# Patient Record
Sex: MALE | Race: WHITE | NOT HISPANIC OR LATINO | Employment: OTHER | URBAN - NONMETROPOLITAN AREA
[De-identification: names, ages, dates, MRNs, and addresses within clinical notes are randomized per-mention and may not be internally consistent; named-entity substitution may affect disease eponyms.]

---

## 2022-09-02 DIAGNOSIS — F90.9 ADULT ADHD: Primary | ICD-10-CM

## 2022-09-02 RX ORDER — DEXTROAMPHETAMINE SACCHARATE, AMPHETAMINE ASPARTATE, DEXTROAMPHETAMINE SULFATE AND AMPHETAMINE SULFATE 3.75; 3.75; 3.75; 3.75 MG/1; MG/1; MG/1; MG/1
TABLET ORAL
Qty: 60 TABLET | Refills: 0 | Status: SHIPPED | OUTPATIENT
Start: 2022-09-02 | End: 2022-09-30

## 2022-09-30 DIAGNOSIS — F90.9 ADULT ADHD: ICD-10-CM

## 2022-09-30 RX ORDER — DEXTROAMPHETAMINE SACCHARATE, AMPHETAMINE ASPARTATE, DEXTROAMPHETAMINE SULFATE AND AMPHETAMINE SULFATE 3.75; 3.75; 3.75; 3.75 MG/1; MG/1; MG/1; MG/1
TABLET ORAL
Qty: 60 TABLET | Refills: 0 | Status: SHIPPED | OUTPATIENT
Start: 2022-09-30 | End: 2022-11-01 | Stop reason: SDUPTHER

## 2022-10-14 PROBLEM — I10 PRIMARY HYPERTENSION: Status: ACTIVE | Noted: 2022-10-14

## 2022-10-14 PROBLEM — E78.2 MIXED HYPERLIPIDEMIA: Status: ACTIVE | Noted: 2022-10-14

## 2022-10-14 PROBLEM — F90.9 ADULT ADHD: Status: ACTIVE | Noted: 2022-10-14

## 2022-10-14 PROBLEM — F17.200 TOBACCO DEPENDENCE: Status: ACTIVE | Noted: 2022-10-14

## 2022-10-14 PROBLEM — E11.9 TYPE 2 DIABETES MELLITUS WITHOUT COMPLICATION, WITHOUT LONG-TERM CURRENT USE OF INSULIN: Status: ACTIVE | Noted: 2022-10-14

## 2022-11-01 DIAGNOSIS — F90.9 ADULT ADHD: ICD-10-CM
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
## 2022-11-02 ENCOUNTER — OFFICE VISIT (OUTPATIENT)
Dept: FAMILY MEDICINE CLINIC | Facility: CLINIC | Age: 37
End: 2022-11-02

## 2022-11-02 VITALS
HEIGHT: 73 IN | SYSTOLIC BLOOD PRESSURE: 130 MMHG | WEIGHT: 185 LBS | OXYGEN SATURATION: 98 % | BODY MASS INDEX: 24.52 KG/M2 | DIASTOLIC BLOOD PRESSURE: 72 MMHG | TEMPERATURE: 98 F | HEART RATE: 82 BPM | RESPIRATION RATE: 17 BRPM

## 2022-11-02 DIAGNOSIS — E78.2 MIXED HYPERLIPIDEMIA: ICD-10-CM

## 2022-11-02 DIAGNOSIS — E11.9 TYPE 2 DIABETES MELLITUS WITHOUT COMPLICATION, WITHOUT LONG-TERM CURRENT USE OF INSULIN: ICD-10-CM

## 2022-11-02 DIAGNOSIS — F90.9 ADULT ADHD: ICD-10-CM

## 2022-11-02 DIAGNOSIS — Z00.00 WELL ADULT EXAM: Primary | ICD-10-CM

## 2022-11-02 PROCEDURE — 81003 URINALYSIS AUTO W/O SCOPE: CPT | Performed by: PHYSICIAN ASSISTANT

## 2022-11-02 PROCEDURE — 82043 UR ALBUMIN QUANTITATIVE: CPT | Performed by: PHYSICIAN ASSISTANT

## 2022-11-02 PROCEDURE — 80061 LIPID PANEL: CPT | Performed by: PHYSICIAN ASSISTANT

## 2022-11-02 PROCEDURE — 99395 PREV VISIT EST AGE 18-39: CPT | Performed by: PHYSICIAN ASSISTANT

## 2022-11-02 PROCEDURE — 2014F MENTAL STATUS ASSESS: CPT | Performed by: PHYSICIAN ASSISTANT

## 2022-11-02 PROCEDURE — 85007 BL SMEAR W/DIFF WBC COUNT: CPT | Performed by: PHYSICIAN ASSISTANT

## 2022-11-02 PROCEDURE — 90686 IIV4 VACC NO PRSV 0.5 ML IM: CPT | Performed by: PHYSICIAN ASSISTANT

## 2022-11-02 PROCEDURE — 86803 HEPATITIS C AB TEST: CPT | Performed by: PHYSICIAN ASSISTANT

## 2022-11-02 PROCEDURE — 83036 HEMOGLOBIN GLYCOSYLATED A1C: CPT | Performed by: PHYSICIAN ASSISTANT

## 2022-11-02 PROCEDURE — 36415 COLL VENOUS BLD VENIPUNCTURE: CPT | Performed by: PHYSICIAN ASSISTANT

## 2022-11-02 PROCEDURE — 3008F BODY MASS INDEX DOCD: CPT | Performed by: PHYSICIAN ASSISTANT

## 2022-11-02 PROCEDURE — 90471 IMMUNIZATION ADMIN: CPT | Performed by: PHYSICIAN ASSISTANT

## 2022-11-02 PROCEDURE — 80050 GENERAL HEALTH PANEL: CPT | Performed by: PHYSICIAN ASSISTANT

## 2022-11-02 RX ORDER — DEXTROAMPHETAMINE SACCHARATE, AMPHETAMINE ASPARTATE, DEXTROAMPHETAMINE SULFATE AND AMPHETAMINE SULFATE 3.75; 3.75; 3.75; 3.75 MG/1; MG/1; MG/1; MG/1
1 TABLET ORAL 2 TIMES DAILY
Qty: 60 TABLET | Refills: 0 | Status: SHIPPED | OUTPATIENT
Start: 2022-11-02 | End: 2022-12-06 | Stop reason: SDUPTHER

## 2022-11-02 RX ORDER — LISINOPRIL 40 MG/1
40 TABLET ORAL DAILY
Qty: 90 TABLET | Refills: 3 | Status: SHIPPED | OUTPATIENT
Start: 2022-11-02 | End: 2022-12-06 | Stop reason: SDUPTHER

## 2022-11-02 RX ORDER — EMPAGLIFLOZIN 25 MG/1
25 TABLET, FILM COATED ORAL DAILY
Qty: 90 TABLET | Refills: 3 | Status: SHIPPED | OUTPATIENT
Start: 2022-11-02 | End: 2022-12-06 | Stop reason: SDUPTHER

## 2022-11-02 RX ORDER — ATORVASTATIN CALCIUM 80 MG/1
80 TABLET, FILM COATED ORAL DAILY
Qty: 90 TABLET | Refills: 3 | Status: SHIPPED | OUTPATIENT
Start: 2022-11-02 | End: 2022-12-06 | Stop reason: SDUPTHER
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
## 2022-11-03 LAB
ALBUMIN SERPL-MCNC: 4.9 G/DL (ref 3.5–5.2)
ALBUMIN UR-MCNC: <1.2 MG/DL
ALBUMIN/GLOB SERPL: 1.8 G/DL
ALP SERPL-CCNC: 107 U/L (ref 39–117)
ALT SERPL W P-5'-P-CCNC: 15 U/L (ref 1–41)
ANION GAP SERPL CALCULATED.3IONS-SCNC: 12 MMOL/L (ref 5–15)
AST SERPL-CCNC: 10 U/L (ref 1–40)
BILIRUB SERPL-MCNC: 0.5 MG/DL (ref 0–1.2)
BILIRUB UR QL STRIP: NEGATIVE
BUN SERPL-MCNC: 15 MG/DL (ref 6–20)
BUN/CREAT SERPL: 13.6 (ref 7–25)
CALCIUM SPEC-SCNC: 10.2 MG/DL (ref 8.6–10.5)
CHLORIDE SERPL-SCNC: 95 MMOL/L (ref 98–107)
CHOLEST SERPL-MCNC: 226 MG/DL (ref 0–200)
CLARITY UR: CLEAR
CO2 SERPL-SCNC: 28 MMOL/L (ref 22–29)
COLOR UR: YELLOW
CREAT SERPL-MCNC: 1.1 MG/DL (ref 0.76–1.27)
DEPRECATED RDW RBC AUTO: 40.4 FL (ref 37–54)
EGFRCR SERPLBLD CKD-EPI 2021: 89.2 ML/MIN/1.73
ERYTHROCYTE [DISTWIDTH] IN BLOOD BY AUTOMATED COUNT: 11.7 % (ref 12.3–15.4)
GLOBULIN UR ELPH-MCNC: 2.7 GM/DL
GLUCOSE SERPL-MCNC: 344 MG/DL (ref 65–99)
GLUCOSE UR STRIP-MCNC: ABNORMAL MG/DL
HBA1C MFR BLD: 11.6 % (ref 4.8–5.6)
HCT VFR BLD AUTO: 45.5 % (ref 37.5–51)
HCV AB SER DONR QL: NORMAL
HDLC SERPL-MCNC: 51 MG/DL (ref 40–60)
HGB BLD-MCNC: 15.6 G/DL (ref 13–17.7)
HGB UR QL STRIP.AUTO: NEGATIVE
KETONES UR QL STRIP: NEGATIVE
LDLC SERPL CALC-MCNC: 151 MG/DL (ref 0–100)
LDLC/HDLC SERPL: 2.91 {RATIO}
LEUKOCYTE ESTERASE UR QL STRIP.AUTO: NEGATIVE
LYMPHOCYTES # BLD MANUAL: 3.23 10*3/MM3 (ref 0.7–3.1)
LYMPHOCYTES NFR BLD MANUAL: 15.3 % (ref 5–12)
MCH RBC QN AUTO: 31.5 PG (ref 26.6–33)
MCHC RBC AUTO-ENTMCNC: 34.3 G/DL (ref 31.5–35.7)
MCV RBC AUTO: 91.7 FL (ref 79–97)
MONOCYTES # BLD: 1.15 10*3/MM3 (ref 0.1–0.9)
NEUTROPHILS # BLD AUTO: 3.15 10*3/MM3 (ref 1.7–7)
NEUTROPHILS NFR BLD MANUAL: 41.8 % (ref 42.7–76)
NITRITE UR QL STRIP: NEGATIVE
PH UR STRIP.AUTO: 5.5 [PH] (ref 5–8)
PLAT MORPH BLD: NORMAL
PLATELET # BLD AUTO: 254 10*3/MM3 (ref 140–450)
PMV BLD AUTO: 10.4 FL (ref 6–12)
POIKILOCYTOSIS BLD QL SMEAR: ABNORMAL
POTASSIUM SERPL-SCNC: 4.2 MMOL/L (ref 3.5–5.2)
PROT SERPL-MCNC: 7.6 G/DL (ref 6–8.5)
PROT UR QL STRIP: NEGATIVE
RBC # BLD AUTO: 4.96 10*6/MM3 (ref 4.14–5.8)
SODIUM SERPL-SCNC: 135 MMOL/L (ref 136–145)
SP GR UR STRIP: >=1.03 (ref 1–1.03)
TRIGL SERPL-MCNC: 132 MG/DL (ref 0–150)
TSH SERPL DL<=0.05 MIU/L-ACNC: 1.73 UIU/ML (ref 0.27–4.2)
UROBILINOGEN UR QL STRIP: ABNORMAL
VARIANT LYMPHS NFR BLD MANUAL: 42.9 % (ref 19.6–45.3)
VLDLC SERPL-MCNC: 24 MG/DL (ref 5–40)
WBC MORPH BLD: NORMAL
WBC NRBC COR # BLD: 7.54 10*3/MM3 (ref 3.4–10.8)

## 2022-11-10 ENCOUNTER — OFFICE VISIT (OUTPATIENT)
Dept: FAMILY MEDICINE CLINIC | Facility: CLINIC | Age: 37
End: 2022-11-10

## 2022-11-10 VITALS
DIASTOLIC BLOOD PRESSURE: 80 MMHG | HEIGHT: 73 IN | TEMPERATURE: 98 F | SYSTOLIC BLOOD PRESSURE: 132 MMHG | OXYGEN SATURATION: 98 % | WEIGHT: 185 LBS | HEART RATE: 82 BPM | BODY MASS INDEX: 24.52 KG/M2

## 2022-11-10 DIAGNOSIS — E11.9 TYPE 2 DIABETES MELLITUS WITHOUT COMPLICATION, WITHOUT LONG-TERM CURRENT USE OF INSULIN: Primary | ICD-10-CM

## 2022-11-10 DIAGNOSIS — F17.200 TOBACCO DEPENDENCE: ICD-10-CM

## 2022-11-10 DIAGNOSIS — I10 PRIMARY HYPERTENSION: ICD-10-CM

## 2022-11-10 DIAGNOSIS — E78.2 MIXED HYPERLIPIDEMIA: ICD-10-CM

## 2022-11-10 PROCEDURE — 99214 OFFICE O/P EST MOD 30 MIN: CPT | Performed by: FAMILY MEDICINE

## 2022-11-10 NOTE — PROGRESS NOTES
"    Follow Up Office Visit      Date: 11/10/2022   Patient Name: Colten Ogden  : 1985   MRN: [id number]     Chief Complaint:    Chief Complaint   Patient presents with   • Diabetes     Fu with labs       History of Present Illness: Colten Ogden is a 37 y.o. male who is here today for follow-up of his recent laboratory data that revealed an elevated hemoglobin A1c 11.6.  He also had abnormal cholesterol as well as some other laboratory data.  Patient states he is not taking his medication on a routine basis he is only averaging medications maybe once or twice a week.  He states he somehow \"forgets\" but somehow remembers to take his ADHD medication.  Patient denies any symptomatology at present time.  He has been seen by his optometrist who states that he has no diabetes involving his eyes as of yet.  He did denies chest pain, shortness of breath, PND, orthopnea, pedal edema or palpitations but has had increasing urinary frequency.  He denies any melena, hematochezia or hematemesis.  No other problems been noted.  He does continue to smoke and does not wish to discontinue this habit at present time.    Subjective      Review of Systems:   Review of Systems   Constitutional: Negative for activity change, appetite change and fatigue.   Respiratory: Negative for cough and shortness of breath.    Cardiovascular: Negative for chest pain, palpitations and leg swelling.   Gastrointestinal: Negative for abdominal pain, constipation, diarrhea, nausea and vomiting.   Genitourinary: Positive for nocturia and urgency. Negative for dysuria, flank pain and frequency.   Neurological: Negative for dizziness, weakness and memory problem.       I have reviewed the patients family history, social history, past medical history, past surgical history and have updated it as appropriate.     Medications:     Current Outpatient Medications:   •  amphetamine-dextroamphetamine (ADDERALL) 15 MG tablet, Take 1 tablet by mouth 2 (Two) " "Times a Day., Disp: 60 tablet, Rfl: 0  •  atorvastatin (LIPITOR) 80 MG tablet, Take 1 tablet by mouth Daily. for cholesterol, Disp: 90 tablet, Rfl: 3  •  Jardiance 25 MG tablet tablet, Take 1 tablet by mouth Daily., Disp: 90 tablet, Rfl: 3  •  Lancets (OneTouch Delica Plus [id number]) misc, 1 each by Other route Daily., Disp: , Rfl:   •  lisinopril (PRINIVIL,ZESTRIL) 40 MG tablet, Take 1 tablet by mouth Daily., Disp: 90 tablet, Rfl: 3  •  metFORMIN (GLUCOPHAGE) 1000 MG tablet, Take 1 tablet by mouth 2 (Two) Times a Day With Meals., Disp: 180 tablet, Rfl: 3    Allergies:   No Known Allergies    Immunizations:   Immunization History   Administered Date(s) Administered   • COVID-19 (UNSPECIFIED) 03/24/2021, 04/21/2021   • FluLaval/Fluzone >6mos 09/14/2018, 11/02/2022   • Hep B, Adolescent or Pediatric 02/13/2002, 03/18/2002, 08/13/2002   • Influenza, Unspecified 09/25/2009   • Td 08/13/2002        Objective     Physical Exam: Please see above  Vital Signs:   Vitals:    11/10/22 1518   BP: 132/80   BP Location: Left arm   Patient Position: Sitting   Cuff Size: Adult   Pulse: 82   Temp: 98 °F (36.7 °C)   SpO2: 98%   Weight: 83.9 kg (185 lb)   Height: 185.4 cm (73\")     Body mass index is 24.41 kg/m².  BMI is within normal parameters. No other follow-up for BMI required.       Physical Exam  Vitals and nursing note reviewed.   Constitutional:       Appearance: Normal appearance.   HENT:      Head: Normocephalic and atraumatic.      Nose: Nose normal.      Mouth/Throat:      Pharynx: Oropharynx is clear.   Eyes:      Extraocular Movements: Extraocular movements intact.      Pupils: Pupils are equal, round, and reactive to light.   Neck:      Thyroid: No thyroid mass or thyromegaly.      Trachea: Trachea normal.   Cardiovascular:      Rate and Rhythm: Normal rate and regular rhythm.      Pulses: Normal pulses. No decreased pulses.      Heart sounds: Normal heart sounds.   Pulmonary:      Effort: Pulmonary effort is normal. "      Breath sounds: Normal breath sounds.   Abdominal:      General: Abdomen is flat. Bowel sounds are normal.      Palpations: Abdomen is soft.      Tenderness: There is no abdominal tenderness.   Musculoskeletal:      Cervical back: Neck supple.      Right lower leg: No edema.      Left lower leg: No edema.   Lymphadenopathy:      Cervical: No cervical adenopathy.   Skin:     General: Skin is warm and dry.   Neurological:      General: No focal deficit present.      Mental Status: He is alert and oriented to person, place, and time.      Cranial Nerves: Cranial nerves are intact.      Sensory: Sensation is intact.      Motor: Motor function is intact.      Coordination: Coordination is intact.   Psychiatric:         Attention and Perception: Attention normal.         Mood and Affect: Mood normal.         Speech: Speech normal.         Behavior: Behavior normal.         Procedures    Results:   Labs:   Hemoglobin A1C   Date Value Ref Range Status   11/02/2022 11.60 (H) 4.80 - 5.60 % Final     TSH   Date Value Ref Range Status   11/02/2022 1.730 0.270 - 4.200 uIU/mL Final        POCT Results (if applicable):   Results for orders placed or performed in visit on 11/02/22   Comprehensive Metabolic Panel    Specimen: Arm, Right; Blood   Result Value Ref Range    Glucose 344 (H) 65 - 99 mg/dL    BUN 15 6 - 20 mg/dL    Creatinine 1.10 0.76 - 1.27 mg/dL    Sodium 135 (L) 136 - 145 mmol/L    Potassium 4.2 3.5 - 5.2 mmol/L    Chloride 95 (L) 98 - 107 mmol/L    CO2 28.0 22.0 - 29.0 mmol/L    Calcium 10.2 8.6 - 10.5 mg/dL    Total Protein 7.6 6.0 - 8.5 g/dL    Albumin 4.90 3.50 - 5.20 g/dL    ALT (SGPT) 15 1 - 41 U/L    AST (SGOT) 10 1 - 40 U/L    Alkaline Phosphatase 107 39 - 117 U/L    Total Bilirubin 0.5 0.0 - 1.2 mg/dL    Globulin 2.7 gm/dL    A/G Ratio 1.8 g/dL    BUN/Creatinine Ratio 13.6 7.0 - 25.0    Anion Gap 12.0 5.0 - 15.0 mmol/L    eGFR 89.2 >60.0 mL/min/1.73   Hemoglobin A1c    Specimen: Arm, Right; Blood   Result  Value Ref Range    Hemoglobin A1C 11.60 (H) 4.80 - 5.60 %   TSH    Specimen: Arm, Right; Blood   Result Value Ref Range    TSH 1.730 0.270 - 4.200 uIU/mL   Lipid Panel    Specimen: Arm, Right; Blood   Result Value Ref Range    Total Cholesterol 226 (H) 0 - 200 mg/dL    Triglycerides 132 0 - 150 mg/dL    HDL Cholesterol 51 40 - 60 mg/dL    LDL Cholesterol  151 (H) 0 - 100 mg/dL    VLDL Cholesterol 24 5 - 40 mg/dL    LDL/HDL Ratio 2.91    Hepatitis C Antibody    Specimen: Arm, Right; Blood   Result Value Ref Range    Hepatitis C Ab Non-Reactive Non-Reactive   MicroAlbumin, Urine, Random - Urine, Clean Catch    Specimen: Urine, Clean Catch   Result Value Ref Range    Microalbumin, Urine <1.2 mg/dL   Urinalysis without microscopic (no culture) - Urine, Clean Catch    Specimen: Urine, Clean Catch   Result Value Ref Range    Color, UA Yellow Yellow, Straw    Appearance, UA Clear Clear    pH, UA 5.5 5.0 - 8.0    Specific Gravity, UA >=1.030 1.005 - 1.030    Glucose, UA >=1000 mg/dL (3+) (A) Negative    Ketones, UA Negative Negative    Bilirubin, UA Negative Negative    Blood, UA Negative Negative    Protein, UA Negative Negative    Leuk Esterase, UA Negative Negative    Nitrite, UA Negative Negative    Urobilinogen, UA 0.2 E.U./dL 0.2 - 1.0 E.U./dL   CBC Auto Differential    Specimen: Arm, Right; Blood   Result Value Ref Range    WBC 7.54 3.40 - 10.80 10*3/mm3    RBC 4.96 4.14 - 5.80 10*6/mm3    Hemoglobin 15.6 13.0 - 17.7 g/dL    Hematocrit 45.5 37.5 - 51.0 %    MCV 91.7 79.0 - 97.0 fL    MCH 31.5 26.6 - 33.0 pg    MCHC 34.3 31.5 - 35.7 g/dL    RDW 11.7 (L) 12.3 - 15.4 %    RDW-SD 40.4 37.0 - 54.0 fl    MPV 10.4 6.0 - 12.0 fL    Platelets 254 140 - 450 10*3/mm3   Manual Differential    Specimen: Arm, Right; Blood   Result Value Ref Range    Neutrophil % 41.8 (L) 42.7 - 76.0 %    Lymphocyte % 42.9 19.6 - 45.3 %    Monocyte % 15.3 (H) 5.0 - 12.0 %    Neutrophils Absolute 3.15 1.70 - 7.00 10*3/mm3    Lymphocytes Absolute  "3.23 (H) 0.70 - 3.10 10*3/mm3    Monocytes Absolute 1.15 (H) 0.10 - 0.90 10*3/mm3    Poikilocytes Mod/2+ None Seen    WBC Morphology Normal Normal    Platelet Morphology Normal Normal       Imaging:   No valid procedures specified.     Measures:   Advanced Care Planning:   Patient does not have an advance directive, information provided.    Smoking Cessation:   Smoker.    Assessment / Plan      Assessment/Plan:   Diagnoses and all orders for this visit:    1. Type 2 diabetes mellitus without complication, without long-term current use of insulin (HCC) (Primary)   ` At present time I am sore eval also how to approach a noncompliant patient with respect to diabetes.  Patient seems to be oblivious to other complications that he is causing to his body and again we have encouraged him to take the medication as prescribed.  We have discussed options and he will set his alarm on his phone to 10 AM so that he can remember to take all of his medications at that time.  He is of interest that he has no problems take his ADHD medicine but he states he carries this around with him in his pocket so it would not be \"stolen\".  We have encouraged him to monitor his symptomatology.  He will return to clinic in the next couple weeks to see how he is doing.  I am uncertain if we will be able to provide much more care for him if he is not going to take care of himself.    2. Mixed hyperlipidemia   His lipids are not to goal.  He has not taken his cholesterol medication in quite some time.  We did discuss risk and benefits and he appears to understand the significance of not taking his medication that greatly increases his cardiovascular risk.  We have also encouraged him discontinue smoking as well as take his other medication as prescribed but patient will do as he wishes.  We will continue to encourage him and will reassess in 3 to 4 weeks and may do so on a routine basis to encourage his compliance.    3. Primary " hypertension   Patient does take his blood pressure medicine little bit more frequent than the other medications.  He is doing well at present time with respect to tolerance of the medications.  We again have encouraged him to take it on a daily basis as this reduces his incidence of having further cardiovascular events.    4. Tobacco dependence   Patient continues to smoke.  He is not willing to discontinue present time.  We will continue to encourage him to stop his nicotine usage.      Follow Up:   Return in about 3 weeks (around 12/1/2022) for Recheck.      At [facility], we believe that sharing information builds trust and better relationships. You are receiving this note because you recently visited [facility]. It is possible you will see health information before a provider has talked with you about it. This kind of information can be easy to misunderstand. To help you fully understand what it means for your health, we urge you to discuss this note with your provider.    Steven Rivas MD  [facility]

## 2022-12-05 ENCOUNTER — OFFICE VISIT (OUTPATIENT)
Dept: FAMILY MEDICINE CLINIC | Facility: CLINIC | Age: 37
End: 2022-12-05

## 2022-12-05 VITALS
HEART RATE: 103 BPM | SYSTOLIC BLOOD PRESSURE: 138 MMHG | BODY MASS INDEX: 25.18 KG/M2 | OXYGEN SATURATION: 96 % | HEIGHT: 73 IN | DIASTOLIC BLOOD PRESSURE: 82 MMHG | TEMPERATURE: 98.4 F | WEIGHT: 190 LBS

## 2022-12-05 DIAGNOSIS — E11.65 TYPE 2 DIABETES MELLITUS WITH HYPERGLYCEMIA, WITHOUT LONG-TERM CURRENT USE OF INSULIN: Primary | ICD-10-CM

## 2022-12-05 LAB
EXPIRATION DATE: NORMAL
GLUCOSE BLDC GLUCOMTR-MCNC: 123 MG/DL (ref 70–130)
Lab: NORMAL

## 2022-12-05 PROCEDURE — 99213 OFFICE O/P EST LOW 20 MIN: CPT | Performed by: PHYSICIAN ASSISTANT

## 2022-12-05 PROCEDURE — 82962 GLUCOSE BLOOD TEST: CPT | Performed by: PHYSICIAN ASSISTANT

## 2022-12-05 NOTE — PROGRESS NOTES
"    Follow Up Office Visit      Date: 2022   Patient Name: Colten Ogden  : 1985   MRN: [id number]     Chief Complaint:    Chief Complaint   Patient presents with   • Annual Exam       History of Present Illness: Colten Ogden is a 37 y.o. male who is here today for recent extremely elevated hemoglobin A1c.  He reports that he has been setting an alarm to remind him to take his medication.  He denies any side effects from using it..    Subjective      Review of Systems:   Review of Systems   Constitutional: Negative.    HENT: Negative.    Eyes: Negative.    Respiratory: Negative.    Cardiovascular: Negative.    Gastrointestinal: Negative.        I have reviewed the patients family history, social history, past medical history, past surgical history and have updated it as appropriate.     Medications:     Current Outpatient Medications:   •  amphetamine-dextroamphetamine (ADDERALL) 15 MG tablet, Take 1 tablet by mouth 2 (Two) Times a Day., Disp: 60 tablet, Rfl: 0  •  atorvastatin (LIPITOR) 80 MG tablet, Take 1 tablet by mouth Daily. for cholesterol, Disp: 90 tablet, Rfl: 3  •  Jardiance 25 MG tablet tablet, Take 1 tablet by mouth Daily., Disp: 90 tablet, Rfl: 3  •  Lancets (OneTouch Delica Plus [id number]) misc, 1 each by Other route Daily., Disp: , Rfl:   •  lisinopril (PRINIVIL,ZESTRIL) 40 MG tablet, Take 1 tablet by mouth Daily., Disp: 90 tablet, Rfl: 3  •  metFORMIN (GLUCOPHAGE) 1000 MG tablet, Take 1 tablet by mouth 2 (Two) Times a Day With Meals., Disp: 180 tablet, Rfl: 3    Allergies:   No Known Allergies    Objective     Physical Exam: Please see above  Vital Signs:   Vitals:    22 0822   BP: 138/82   BP Location: Left arm   Patient Position: Sitting   Cuff Size: Adult   Pulse: 103   Temp: 98.4 °F (36.9 °C)   TempSrc: Temporal   SpO2: 96%   Weight: 86.2 kg (190 lb)   Height: 185.4 cm (73\")   PainSc: 0-No pain     Body mass index is 25.07 kg/m².  BMI is >= 25 and <30. (Overweight) The " following options were offered after discussion;: nutrition counseling/recommendations          Physical Exam  Constitutional:       General: He is not in acute distress.     Appearance: Normal appearance. He is not toxic-appearing.   Cardiovascular:      Rate and Rhythm: Normal rate and regular rhythm.      Heart sounds: No murmur heard.    No friction rub. No gallop.   Neurological:      Mental Status: He is alert.         Procedures    Assessment / Plan      Assessment/Plan:   1. Type 2 diabetes mellitus with hyperglycemia, without long-term current use of insulin (HCC)  His POC glucose which was fasting was much improved at 123.  He has been encouraged to continue his medication and keep his appointment that was scheduled in February.  He voices understanding of the risks of allowing his blood sugar to remain high.  - POC Glucose      Follow Up:   No follow-ups on file.      At [facility], we believe that sharing information builds trust and better relationships. You are receiving this note because you recently visited [facility]. It is possible you will see health information before a provider has talked with you about it. This kind of information can be easy to misunderstand. To help you fully understand what it means for your health, we urge you to discuss this note with your provider.    Janee Rivas PA-C  [facility]

## 2022-12-06 DIAGNOSIS — F90.9 ADULT ADHD: ICD-10-CM

## 2022-12-06 RX ORDER — ATORVASTATIN CALCIUM 80 MG/1
80 TABLET, FILM COATED ORAL DAILY
Qty: 90 TABLET | Refills: 3 | Status: SHIPPED | OUTPATIENT
Start: 2022-12-06

## 2022-12-06 RX ORDER — LISINOPRIL 40 MG/1
40 TABLET ORAL DAILY
Qty: 90 TABLET | Refills: 3 | Status: SHIPPED | OUTPATIENT
Start: 2022-12-06

## 2022-12-06 RX ORDER — DEXTROAMPHETAMINE SACCHARATE, AMPHETAMINE ASPARTATE, DEXTROAMPHETAMINE SULFATE AND AMPHETAMINE SULFATE 3.75; 3.75; 3.75; 3.75 MG/1; MG/1; MG/1; MG/1
1 TABLET ORAL 2 TIMES DAILY
Qty: 60 TABLET | Refills: 0 | Status: SHIPPED | OUTPATIENT
Start: 2022-12-06 | End: 2023-01-04 | Stop reason: SDUPTHER

## 2022-12-06 RX ORDER — EMPAGLIFLOZIN 25 MG/1
25 TABLET, FILM COATED ORAL DAILY
Qty: 90 TABLET | Refills: 3 | Status: SHIPPED | OUTPATIENT
Start: 2022-12-06

## 2022-12-06 NOTE — TELEPHONE ENCOUNTER
Caller: Colten Ogden    Relationship: Self    Best call back number: [phone]    Requested Prescriptions:   Requested Prescriptions     Pending Prescriptions Disp Refills   • amphetamine-dextroamphetamine (ADDERALL) 15 MG tablet 60 tablet 0     Sig: Take 1 tablet by mouth 2 (Two) Times a Day.   • Jardiance 25 MG tablet tablet 90 tablet 3     Sig: Take 1 tablet by mouth Daily.   • metFORMIN (GLUCOPHAGE) 1000 MG tablet 180 tablet 3     Sig: Take 1 tablet by mouth 2 (Two) Times a Day With Meals.   • lisinopril (PRINIVIL,ZESTRIL) 40 MG tablet 90 tablet 3     Sig: Take 1 tablet by mouth Daily.   • atorvastatin (LIPITOR) 80 MG tablet 90 tablet 3     Sig: Take 1 tablet by mouth Daily. for cholesterol        [address] - [phone]  - [phone] FX     Additional details provided by patient:     Does the patient have less than a 3 day supply:  [x] Yes  [] No    Sammy Kim Rep   12/06/22 11:08 EST

## 2023-01-04 DIAGNOSIS — F90.9 ADULT ADHD: Primary | ICD-10-CM

## 2023-01-04 DIAGNOSIS — F90.9 ADULT ADHD: ICD-10-CM

## 2023-01-04 RX ORDER — DEXTROAMPHETAMINE SACCHARATE, AMPHETAMINE ASPARTATE, DEXTROAMPHETAMINE SULFATE AND AMPHETAMINE SULFATE 3.75; 3.75; 3.75; 3.75 MG/1; MG/1; MG/1; MG/1
1 TABLET ORAL 2 TIMES DAILY
Qty: 60 TABLET | Refills: 0 | Status: SHIPPED | OUTPATIENT
Start: 2023-01-04 | End: 2023-01-04 | Stop reason: SDUPTHER

## 2023-01-04 RX ORDER — DEXTROAMPHETAMINE SACCHARATE, AMPHETAMINE ASPARTATE, DEXTROAMPHETAMINE SULFATE AND AMPHETAMINE SULFATE 7.5; 7.5; 7.5; 7.5 MG/1; MG/1; MG/1; MG/1
15 TABLET ORAL 2 TIMES DAILY
Qty: 30 TABLET | Refills: 0 | Status: SHIPPED | OUTPATIENT
Start: 2023-01-04 | End: 2023-02-06

## 2023-01-04 NOTE — TELEPHONE ENCOUNTER
Caller: Colten Ogden    Relationship: Self    Best call back number: [phone]    Requested Prescriptions:   Requested Prescriptions     Pending Prescriptions Disp Refills   • amphetamine-dextroamphetamine (ADDERALL) 15 MG tablet 60 tablet 0     Sig: Take 1 tablet by mouth 2 (Two) Times a Day.        [address] be [address] [facility] - [phone]  - [phone] FX     Additional details provided by patient: PATIENT HAS FEW DAYS LEFT OF MEDICATION    Does the patient have less than a 3 day supply:  [x] Yes  [] No    Would you like a call back once the refill request has been completed: [x] Yes [] No    If the office needs to give you a call back, can they leave a voicemail: [x] Yes [] No    Sammy Davenport Rep   01/04/23 08:38 EST

## 2023-02-06 ENCOUNTER — OFFICE VISIT (OUTPATIENT)
Dept: FAMILY MEDICINE CLINIC | Facility: CLINIC | Age: 38
End: 2023-02-06
Payer: COMMERCIAL

## 2023-02-06 VITALS
WEIGHT: 180 LBS | RESPIRATION RATE: 15 BRPM | TEMPERATURE: 98 F | HEART RATE: 70 BPM | SYSTOLIC BLOOD PRESSURE: 100 MMHG | BODY MASS INDEX: 23.75 KG/M2 | DIASTOLIC BLOOD PRESSURE: 70 MMHG | OXYGEN SATURATION: 98 %

## 2023-02-06 DIAGNOSIS — E11.65 TYPE 2 DIABETES MELLITUS WITH HYPERGLYCEMIA, WITHOUT LONG-TERM CURRENT USE OF INSULIN: Primary | ICD-10-CM

## 2023-02-06 DIAGNOSIS — F90.9 ADULT ADHD: ICD-10-CM

## 2023-02-06 DIAGNOSIS — F17.200 TOBACCO DEPENDENCE: ICD-10-CM

## 2023-02-06 DIAGNOSIS — Z23 NEED FOR PNEUMOCOCCAL VACCINATION: ICD-10-CM

## 2023-02-06 DIAGNOSIS — E78.2 MIXED HYPERLIPIDEMIA: ICD-10-CM

## 2023-02-06 DIAGNOSIS — I10 PRIMARY HYPERTENSION: ICD-10-CM

## 2023-02-06 LAB
AMPHET+METHAMPHET UR QL: POSITIVE
AMPHETAMINES UR QL: POSITIVE
BARBITURATES UR QL SCN: NEGATIVE
BENZODIAZ UR QL SCN: NEGATIVE
BUPRENORPHINE SERPL-MCNC: NEGATIVE NG/ML
CANNABINOIDS SERPL QL: NEGATIVE
COCAINE UR QL: NEGATIVE
METHADONE UR QL SCN: NEGATIVE
OPIATES UR QL: NEGATIVE
OXYCODONE UR QL SCN: NEGATIVE
PCP UR QL SCN: NEGATIVE
PROPOXYPH UR QL: NEGATIVE
TRICYCLICS UR QL SCN: NEGATIVE

## 2023-02-06 PROCEDURE — 80053 COMPREHEN METABOLIC PANEL: CPT | Performed by: FAMILY MEDICINE

## 2023-02-06 PROCEDURE — 80306 DRUG TEST PRSMV INSTRMNT: CPT | Performed by: FAMILY MEDICINE

## 2023-02-06 PROCEDURE — 80061 LIPID PANEL: CPT | Performed by: FAMILY MEDICINE

## 2023-02-06 PROCEDURE — 99214 OFFICE O/P EST MOD 30 MIN: CPT | Performed by: FAMILY MEDICINE

## 2023-02-06 PROCEDURE — 83036 HEMOGLOBIN GLYCOSYLATED A1C: CPT | Performed by: FAMILY MEDICINE

## 2023-02-06 RX ORDER — DEXTROAMPHETAMINE SACCHARATE, AMPHETAMINE ASPARTATE, DEXTROAMPHETAMINE SULFATE AND AMPHETAMINE SULFATE 7.5; 7.5; 7.5; 7.5 MG/1; MG/1; MG/1; MG/1
0.5 TABLET ORAL 2 TIMES DAILY
Qty: 30 TABLET | Refills: 0 | Status: CANCELLED | OUTPATIENT
Start: 2023-02-06

## 2023-02-06 RX ORDER — DEXTROAMPHETAMINE SACCHARATE, AMPHETAMINE ASPARTATE, DEXTROAMPHETAMINE SULFATE AND AMPHETAMINE SULFATE 7.5; 7.5; 7.5; 7.5 MG/1; MG/1; MG/1; MG/1
TABLET ORAL
Qty: 30 TABLET | Refills: 0 | Status: SHIPPED | OUTPATIENT
Start: 2023-02-06 | End: 2023-03-07

## 2023-02-06 NOTE — PROGRESS NOTES
Follow Up Office Visit      Date: 2023   Patient Name: Colten Ogden  : 1985   MRN: [id number]     Chief Complaint:    Chief Complaint   Patient presents with   • ADHD     fu   • Diabetes     fu       History of Present Illness: Colten Ogden is a 37 y.o. male who is here today for follow-up of his multiple medical conditions.  He is doing well with the ADHD medications without any side effects.  He has continued to take on a daily basis and does well without difficulties.  He has been taking his medications more faithfully.  He states that he now takes it on average 4 to 5 days a week.  He will occasionally forget.  He has been trying to monitor his diet and has increase his activity level.  He denies any episodes of hypoglycemia at present time.  He has continued with normal activity, appetite and sleep.  Patient denies any other cardiovascular, respiratory, gastrointestinal, urologic or neurologic symptomatology.    Subjective      Review of Systems:   Review of Systems   Constitutional: Negative for activity change, appetite change and fatigue.   Respiratory: Negative for cough and shortness of breath.    Cardiovascular: Negative for chest pain, palpitations and leg swelling.   Gastrointestinal: Negative for abdominal pain, constipation, diarrhea, nausea and vomiting.   Genitourinary: Negative for dysuria, flank pain, frequency and urgency.   Musculoskeletal: Negative for arthralgias, gait problem and myalgias.   Neurological: Negative for dizziness, weakness and memory problem.   Psychiatric/Behavioral: Negative for sleep disturbance.       I have reviewed the patients family history, social history, past medical history, past surgical history and have updated it as appropriate.     Medications:     Current Outpatient Medications:   •  amphetamine-dextroamphetamine (ADDERALL) 30 MG tablet, Take 0.5 tablets by mouth 2 (Two) Times a Day., Disp: 30 tablet, Rfl: 0  •  atorvastatin (LIPITOR) 80 MG  tablet, Take 1 tablet by mouth Daily. for cholesterol, Disp: 90 tablet, Rfl: 3  •  Jardiance 25 MG tablet tablet, Take 1 tablet by mouth Daily., Disp: 90 tablet, Rfl: 3  •  Lancets (OneTouch Delica Plus [id number]) misc, 1 each by Other route Daily., Disp: , Rfl:   •  lisinopril (PRINIVIL,ZESTRIL) 40 MG tablet, Take 1 tablet by mouth Daily., Disp: 90 tablet, Rfl: 3  •  metFORMIN (GLUCOPHAGE) 1000 MG tablet, Take 1 tablet by mouth 2 (Two) Times a Day With Meals., Disp: 180 tablet, Rfl: 3    Allergies:   No Known Allergies    Immunizations:   Immunization History   Administered Date(s) Administered   • COVID-19 (UNSPECIFIED) 03/24/2021, 04/21/2021   • FluLaval/Fluzone >6mos 09/14/2018, 11/02/2022   • Hep B, Adolescent or Pediatric 02/13/2002, 03/18/2002, 08/13/2002   • Influenza, Unspecified 09/25/2009, 11/02/2022   • Td 08/13/2002        Objective     Physical Exam: Please see above  Vital Signs:   Vitals:    02/06/23 0813   BP: 100/70   BP Location: Left arm   Patient Position: Sitting   Cuff Size: Adult   Pulse: 70   Resp: 15   Temp: 98 °F (36.7 °C)   SpO2: 98%   Weight: 81.6 kg (180 lb)     Body mass index is 23.75 kg/m².  BMI is within normal parameters. No other follow-up for BMI required.       Physical Exam  Vitals and nursing note reviewed.   Constitutional:       Appearance: Normal appearance.   HENT:      Head: Normocephalic and atraumatic.      Nose: Nose normal.      Mouth/Throat:      Pharynx: Oropharynx is clear.   Eyes:      Extraocular Movements: Extraocular movements intact.      Pupils: Pupils are equal, round, and reactive to light.   Neck:      Thyroid: No thyroid mass or thyromegaly.      Trachea: Trachea normal.   Cardiovascular:      Rate and Rhythm: Normal rate and regular rhythm.      Pulses: Normal pulses. No decreased pulses.           Dorsalis pedis pulses are 2+ on the right side and 2+ on the left side.        Posterior tibial pulses are 2+ on the right side and 2+ on the left side.       Heart sounds: Normal heart sounds.   Pulmonary:      Effort: Pulmonary effort is normal.      Breath sounds: Normal breath sounds.   Abdominal:      General: Abdomen is flat. Bowel sounds are normal.      Palpations: Abdomen is soft.      Tenderness: There is no abdominal tenderness.   Musculoskeletal:      Cervical back: Neck supple.      Right lower leg: No edema.      Left lower leg: No edema.      Right foot: No deformity.      Left foot: No deformity.   Feet:      Right foot:      Protective Sensation: 3 sites tested. 3 sites sensed.      Skin integrity: Callus and dry skin present. No ulcer, skin breakdown or erythema.      Left foot:      Protective Sensation: 3 sites tested. 3 sites sensed.      Skin integrity: Callus and dry skin present. No ulcer, skin breakdown or erythema.      Comments: Bilateral MP of great toe and heel region callus.  Lymphadenopathy:      Cervical: No cervical adenopathy.   Skin:     General: Skin is warm and dry.   Neurological:      General: No focal deficit present.      Mental Status: He is alert and oriented to person, place, and time.      Sensory: Sensation is intact.      Motor: Motor function is intact.      Coordination: Coordination is intact.   Psychiatric:         Attention and Perception: Attention normal.         Mood and Affect: Mood normal.         Speech: Speech normal.         Behavior: Behavior normal.         Procedures    Results:   Labs:   Hemoglobin A1C   Date Value Ref Range Status   11/02/2022 11.60 (H) 4.80 - 5.60 % Final     TSH   Date Value Ref Range Status   11/02/2022 1.730 0.270 - 4.200 uIU/mL Final        POCT Results (if applicable):   Results for orders placed or performed in visit on 12/05/22   POC Glucose    Specimen: Blood   Result Value Ref Range    Glucose 123 70 - 130 mg/dL    Lot Number [id number]     Expiration Date 03/28/2023        Imaging:   No valid procedures specified.     Measures:   Advanced Care Planning:   Patient does not have an  advance directive, information provided.    Smoking Cessation:   less than 3 minutes spent counseling Has reduced [place] use    Assessment / Plan      Assessment/Plan:   Diagnoses and all orders for this visit:    1. Type 2 diabetes mellitus with hyperglycemia, without long-term current use of insulin ([facility]) (Primary)   Patient's hemoglobin A1c was extremely elevated on previous check.  We will recheck his hemoglobin A1c and hopefully will show improvement.  If not we will need to add additional medications with possibly insulin.  Patient has been doing better with respect to his medications but still does not take it on a daily basis.  He also had diabetic foot exam that did reveal calluses on his MP joint of both great toes.  He also has callus formation on his heels.  We have encouraged him to continue with daily inspection as well as washing with application of Vaseline to the calluses to hopefully soften.  We also have advised that he should keep a yearly eye exam with Dr. Akhtar.  We will discuss options with respect to the blood test once we receive the hemoglobin A1c.  -     Hemoglobin A1c; Future  -     Hemoglobin A1c    2. Mixed hyperlipidemia   Patient is taking his Lipitor faithfully.  We will obtain lipid profile will make adjustments based on this data.  -     Lipid Panel; Future  -     Lipid Panel    3. Primary hypertension   Patient's blood pressure has been doing well.  We will continue his current regimen.  -     Comprehensive Metabolic Panel; Future  -     Comprehensive Metabolic Panel    4. Adult ADHD   Patient's previous Williams was appropriate.  He is due for urine drug screen.  This will be obtained today.  -     Urine Drug Screen - Urine, Clean Catch; Future  -     Urine Drug Screen - Urine, Clean Catch    5. Tobacco dependence   Patient has not smoked a cigarette for 4 days.  He is using electronic cigarettes in the meantime.  We again have encouraged him to discontinue smoking.    6. Need  for pneumococcal vaccination   Patient was instructed to contact the pharmacist with respect to his tetanus shot and pneumonia vaccine.      Follow Up:   Return in about 3 months (around 5/6/2023) for Recheck.      At [facility], we believe that sharing information builds trust and better relationships. You are receiving this note because you recently visited [facility]. It is possible you will see health information before a provider has talked with you about it. This kind of information can be easy to misunderstand. To help you fully understand what it means for your health, we urge you to discuss this note with your provider.    Steven Rivas MD  [facility]

## 2023-02-06 NOTE — TELEPHONE ENCOUNTER
Caller: Colten Ogden    Relationship: Self    Best call back number: [phone]    Requested Prescriptions:   Requested Prescriptions     Pending Prescriptions Disp Refills    amphetamine-dextroamphetamine (ADDERALL) 30 MG tablet 30 tablet 0     Sig: Take 0.5 tablets by mouth 2 (Two) Times a Day.      [address] [facility] - [phone]  - [phone]     Additional details provided by patient:     PATIENT ADVISED HE HAS 3 PILLS LEFT     Does the patient have less than a 3 day supply:  [x] Yes  [] No    Would you like a call back once the refill request has been completed: [] Yes [x] No    If the office needs to give you a call back, can they leave a voicemail: [] Yes [x] No    Sammy Ludwig Rep   02/06/23 10:48 EST

## 2023-02-07 LAB
ALBUMIN SERPL-MCNC: 4.5 G/DL (ref 3.5–5.2)
ALBUMIN/GLOB SERPL: 1.7 G/DL
ALP SERPL-CCNC: 79 U/L (ref 39–117)
ALT SERPL W P-5'-P-CCNC: 29 U/L (ref 1–41)
ANION GAP SERPL CALCULATED.3IONS-SCNC: 10.7 MMOL/L (ref 5–15)
AST SERPL-CCNC: 23 U/L (ref 1–40)
BILIRUB SERPL-MCNC: 0.3 MG/DL (ref 0–1.2)
BUN SERPL-MCNC: 19 MG/DL (ref 6–20)
BUN/CREAT SERPL: 16.5 (ref 7–25)
CALCIUM SPEC-SCNC: 9.6 MG/DL (ref 8.6–10.5)
CHLORIDE SERPL-SCNC: 104 MMOL/L (ref 98–107)
CHOLEST SERPL-MCNC: 135 MG/DL (ref 0–200)
CO2 SERPL-SCNC: 27.3 MMOL/L (ref 22–29)
CREAT SERPL-MCNC: 1.15 MG/DL (ref 0.76–1.27)
EGFRCR SERPLBLD CKD-EPI 2021: 84.6 ML/MIN/1.73
GLOBULIN UR ELPH-MCNC: 2.6 GM/DL
GLUCOSE SERPL-MCNC: 190 MG/DL (ref 65–99)
HBA1C MFR BLD: 9.4 % (ref 4.8–5.6)
HDLC SERPL-MCNC: 59 MG/DL (ref 40–60)
LDLC SERPL CALC-MCNC: 61 MG/DL (ref 0–100)
LDLC/HDLC SERPL: 1.02 {RATIO}
POTASSIUM SERPL-SCNC: 3.9 MMOL/L (ref 3.5–5.2)
PROT SERPL-MCNC: 7.1 G/DL (ref 6–8.5)
SODIUM SERPL-SCNC: 142 MMOL/L (ref 136–145)
TRIGL SERPL-MCNC: 79 MG/DL (ref 0–150)
VLDLC SERPL-MCNC: 15 MG/DL (ref 5–40)

## 2023-02-07 RX ORDER — PIOGLITAZONEHYDROCHLORIDE 45 MG/1
45 TABLET ORAL DAILY
Qty: 90 TABLET | Refills: 3 | Status: SHIPPED | OUTPATIENT
Start: 2023-02-07

## 2023-03-07 DIAGNOSIS — F90.9 ADULT ADHD: ICD-10-CM

## 2023-03-07 RX ORDER — DEXTROAMPHETAMINE SACCHARATE, AMPHETAMINE ASPARTATE, DEXTROAMPHETAMINE SULFATE AND AMPHETAMINE SULFATE 7.5; 7.5; 7.5; 7.5 MG/1; MG/1; MG/1; MG/1
TABLET ORAL
Qty: 30 TABLET | Refills: 0 | Status: SHIPPED | OUTPATIENT
Start: 2023-03-07 | End: 2023-04-04 | Stop reason: SDUPTHER

## 2023-03-20 ENCOUNTER — OFFICE VISIT (OUTPATIENT)
Dept: FAMILY MEDICINE CLINIC | Facility: CLINIC | Age: 38
End: 2023-03-20
Payer: COMMERCIAL

## 2023-03-20 VITALS
HEART RATE: 80 BPM | TEMPERATURE: 97 F | SYSTOLIC BLOOD PRESSURE: 126 MMHG | BODY MASS INDEX: 23.86 KG/M2 | DIASTOLIC BLOOD PRESSURE: 78 MMHG | RESPIRATION RATE: 16 BRPM | OXYGEN SATURATION: 97 % | HEIGHT: 73 IN | WEIGHT: 180 LBS

## 2023-03-20 DIAGNOSIS — G89.29 CHRONIC RIGHT SHOULDER PAIN: Primary | ICD-10-CM

## 2023-03-20 DIAGNOSIS — M25.511 CHRONIC RIGHT SHOULDER PAIN: Primary | ICD-10-CM

## 2023-03-20 PROCEDURE — 3046F HEMOGLOBIN A1C LEVEL >9.0%: CPT | Performed by: PHYSICIAN ASSISTANT

## 2023-03-20 PROCEDURE — 3078F DIAST BP <80 MM HG: CPT | Performed by: PHYSICIAN ASSISTANT

## 2023-03-20 PROCEDURE — 3074F SYST BP LT 130 MM HG: CPT | Performed by: PHYSICIAN ASSISTANT

## 2023-03-20 PROCEDURE — 99213 OFFICE O/P EST LOW 20 MIN: CPT | Performed by: PHYSICIAN ASSISTANT
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
## 2023-04-04 DIAGNOSIS — F90.9 ADULT ADHD: ICD-10-CM

## 2023-04-04 RX ORDER — DEXTROAMPHETAMINE SACCHARATE, AMPHETAMINE ASPARTATE, DEXTROAMPHETAMINE SULFATE AND AMPHETAMINE SULFATE 7.5; 7.5; 7.5; 7.5 MG/1; MG/1; MG/1; MG/1
0.5 TABLET ORAL 2 TIMES DAILY
Qty: 30 TABLET | Refills: 0 | Status: SHIPPED | OUTPATIENT
Start: 2023-04-04

## 2023-04-04 NOTE — TELEPHONE ENCOUNTER
Caller: Colten Ogden    Relationship: Self    Best call back number:    [phone]      Requested Prescriptions:   Requested Prescriptions     Pending Prescriptions Disp Refills   • amphetamine-dextroamphetamine (ADDERALL) 30 MG tablet 30 tablet 0     Sig: Take 0.5 tablets by mouth 2 (Two) Times a Day.        [address] [facility] - [phone]  - [phone]      Last office visit with prescribing clinician: 2/6/2023   Last telemedicine visit with prescribing clinician: 5/5/2023   Next office visit with prescribing clinician: 5/5/2023     Additional details provided by patient: PATIENT HAS LESS THAN 3 LEFT    Does the patient have less than a 3 day supply:  [x] Yes  [] No    Would you like a call back once the refill request has been completed: [] Yes [] No    If the office needs to give you a call back, can they leave a voicemail: [] Yes [] No    Sammy Galeana Rep   04/04/23 15:46 EDT

## 2023-05-05 ENCOUNTER — OFFICE VISIT (OUTPATIENT)
Dept: FAMILY MEDICINE CLINIC | Facility: CLINIC | Age: 38
End: 2023-05-05
Payer: COMMERCIAL

## 2023-05-05 VITALS
TEMPERATURE: 97 F | SYSTOLIC BLOOD PRESSURE: 124 MMHG | HEIGHT: 73 IN | DIASTOLIC BLOOD PRESSURE: 68 MMHG | HEART RATE: 70 BPM | WEIGHT: 181 LBS | BODY MASS INDEX: 23.99 KG/M2 | OXYGEN SATURATION: 98 % | RESPIRATION RATE: 16 BRPM

## 2023-05-05 DIAGNOSIS — E11.9 TYPE 2 DIABETES MELLITUS WITHOUT COMPLICATION, WITHOUT LONG-TERM CURRENT USE OF INSULIN: ICD-10-CM

## 2023-05-05 DIAGNOSIS — I10 PRIMARY HYPERTENSION: ICD-10-CM

## 2023-05-05 DIAGNOSIS — F90.9 ADULT ADHD: ICD-10-CM

## 2023-05-05 DIAGNOSIS — E11.65 TYPE 2 DIABETES MELLITUS WITH HYPERGLYCEMIA, WITHOUT LONG-TERM CURRENT USE OF INSULIN: Primary | ICD-10-CM

## 2023-05-05 DIAGNOSIS — E78.2 MIXED HYPERLIPIDEMIA: ICD-10-CM

## 2023-05-05 DIAGNOSIS — F17.200 TOBACCO DEPENDENCE: ICD-10-CM

## 2023-05-05 LAB
CHOLEST SERPL-MCNC: 152 MG/DL (ref 0–200)
HBA1C MFR BLD: 12.5 % (ref 4.8–5.6)
HDLC SERPL-MCNC: 66 MG/DL (ref 40–60)
LDLC SERPL CALC-MCNC: 62 MG/DL (ref 0–100)
LDLC/HDLC SERPL: 0.87 {RATIO}
TRIGL SERPL-MCNC: 142 MG/DL (ref 0–150)
VLDLC SERPL-MCNC: 24 MG/DL (ref 5–40)

## 2023-05-05 PROCEDURE — 80053 COMPREHEN METABOLIC PANEL: CPT | Performed by: FAMILY MEDICINE

## 2023-05-05 PROCEDURE — 83036 HEMOGLOBIN GLYCOSYLATED A1C: CPT | Performed by: FAMILY MEDICINE

## 2023-05-05 PROCEDURE — 80061 LIPID PANEL: CPT | Performed by: FAMILY MEDICINE

## 2023-05-05 RX ORDER — DEXTROAMPHETAMINE SACCHARATE, AMPHETAMINE ASPARTATE, DEXTROAMPHETAMINE SULFATE AND AMPHETAMINE SULFATE 7.5; 7.5; 7.5; 7.5 MG/1; MG/1; MG/1; MG/1
TABLET ORAL
Qty: 30 TABLET | Refills: 0 | Status: SHIPPED | OUTPATIENT
Start: 2023-05-05

## 2023-05-05 RX ORDER — LISINOPRIL 10 MG/1
10 TABLET ORAL DAILY
Qty: 90 TABLET | Refills: 3 | Status: SHIPPED | OUTPATIENT
Start: 2023-05-05

## 2023-05-05 NOTE — PROGRESS NOTES
Follow Up Office Visit      Date: 2023   Patient Name: Colten Ogden  : 1985   MRN: [id number]     Chief Complaint:    Chief Complaint   Patient presents with   • Diabetes     fu       History of Present Illness: Colten Ogden is a 37 y.o. male who is here today for follow-up.  Patient states that he has been taking his medication as prescribed without any problems.  He has noted that when he takes the higher dose of the lisinopril that he becomes a little bit lightheaded.  He has only been taking his blood pressure medicine every other day.  He has otherwise tolerated his medications.  He denies any side effects at present time.  He has not had any change in activity, appetite or sleep.  Patient does continue to use tobacco products but he is no longer smoking cigarettes.  He is using the electronic version.  He denies any cardiovascular, respiratory, gastrointestinal, urologic neurologic complaints.    Subjective      Review of Systems:   Review of Systems   Constitutional: Negative for activity change, appetite change and fatigue.   Respiratory: Negative for cough and shortness of breath.    Cardiovascular: Negative for chest pain, palpitations and leg swelling.   Gastrointestinal: Negative for abdominal distention, abdominal pain, blood in stool, constipation, diarrhea, nausea, vomiting, GERD and indigestion.   Genitourinary: Negative for dysuria, flank pain, frequency and urgency.   Musculoskeletal: Negative for arthralgias, gait problem and myalgias.   Neurological: Negative for dizziness, weakness and memory problem.   Psychiatric/Behavioral: Negative for sleep disturbance and depressed mood. The patient is not nervous/anxious.        I have reviewed the patients family history, social history, past medical history, past surgical history and have updated it as appropriate.     Medications:     Current Outpatient Medications:   •  atorvastatin (LIPITOR) 80 MG tablet, Take 1 tablet by mouth  "Daily. for cholesterol, Disp: 90 tablet, Rfl: 3  •  Jardiance 25 MG tablet tablet, Take 1 tablet by mouth Daily., Disp: 90 tablet, Rfl: 3  •  Lancets (OneTouch Delica Plus [id number]) misc, 1 each by Other route Daily., Disp: , Rfl:   •  lisinopril (PRINIVIL,ZESTRIL) 10 MG tablet, Take 1 tablet by mouth Daily., Disp: 90 tablet, Rfl: 3  •  metFORMIN (GLUCOPHAGE) 1000 MG tablet, Take 1 tablet by mouth 2 (Two) Times a Day With Meals., Disp: 180 tablet, Rfl: 3  •  pioglitazone (Actos) 45 MG tablet, Take 1 tablet by mouth Daily., Disp: 90 tablet, Rfl: 3  •  amphetamine-dextroamphetamine (ADDERALL) 30 MG tablet, TAKE ONE-HALF TABLET BY MOUTH TWO TIMES A DAY, Disp: 30 tablet, Rfl: 0    Allergies:   No Known Allergies    Immunizations:   Immunization History   Administered Date(s) Administered   • COVID-19 (MODERNA) 1st,2nd,3rd Dose Monovalent 03/24/2021, 04/21/2021   • COVID-19 (UNSPECIFIED) 03/24/2021, 04/21/2021   • FluLaval/Fluzone >6mos 09/14/2018, 11/02/2022   • Hep B, Adolescent or Pediatric 02/13/2002, 03/18/2002, 08/13/2002   • Influenza Seasonal Injectable 09/25/2009   • Influenza, Unspecified 09/25/2009, 11/02/2022   • Pneumococcal Conjugate 20-Valent (PCV20) 02/06/2023   • Td 08/13/2002   • Tdap 02/06/2023        Objective     Physical Exam: Please see above  Vital Signs:   Vitals:    05/05/23 0833   BP: 124/68   Pulse: 70   Resp: 16   Temp: 97 °F (36.1 °C)   SpO2: 98%   Weight: 82.1 kg (181 lb)   Height: 185.4 cm (73\")     Body mass index is 23.88 kg/m².  BMI is within normal parameters. No other follow-up for BMI required.       Physical Exam  Vitals and nursing note reviewed.   Constitutional:       Appearance: Normal appearance.   HENT:      Head: Normocephalic and atraumatic.      Nose: Nose normal.      Mouth/Throat:      Pharynx: Oropharynx is clear.   Eyes:      Extraocular Movements: Extraocular movements intact.      Pupils: Pupils are equal, round, and reactive to light.   Neck:      Thyroid: No " thyroid mass or thyromegaly.      Trachea: Trachea normal.   Cardiovascular:      Rate and Rhythm: Normal rate and regular rhythm.      Pulses: Normal pulses. No decreased pulses.      Heart sounds: Normal heart sounds.   Pulmonary:      Effort: Pulmonary effort is normal.      Breath sounds: Normal breath sounds.   Abdominal:      General: Abdomen is flat. Bowel sounds are normal.      Palpations: Abdomen is soft.      Tenderness: There is no abdominal tenderness.   Musculoskeletal:      Cervical back: Neck supple.      Right lower leg: No edema.      Left lower leg: No edema.   Lymphadenopathy:      Cervical: No cervical adenopathy.   Skin:     General: Skin is warm and dry.   Neurological:      General: No focal deficit present.      Mental Status: He is alert and oriented to person, place, and time.      Sensory: Sensation is intact.      Motor: Motor function is intact.      Coordination: Coordination is intact.   Psychiatric:         Attention and Perception: Attention normal.         Mood and Affect: Mood normal.         Speech: Speech normal.         Behavior: Behavior normal.         Procedures    Results:   Labs:   Hemoglobin A1C   Date Value Ref Range Status   02/06/2023 9.40 (H) 4.80 - 5.60 % Final     TSH   Date Value Ref Range Status   11/02/2022 1.730 0.270 - 4.200 uIU/mL Final        POCT Results (if applicable):   Results for orders placed or performed in visit on 02/06/23   Urine Drug Screen - Urine, Clean Catch    Specimen: Urine, Clean Catch   Result Value Ref Range    THC, Screen, Urine Negative Negative    Phencyclidine (PCP), Urine Negative Negative    Cocaine Screen, Urine Negative Negative    Methamphetamine, Ur Positive (A) Negative    Opiate Screen Negative Negative    Amphetamine Screen, Urine Positive (A) Negative    Benzodiazepine Screen, Urine Negative Negative    Tricyclic Antidepressants Screen Negative Negative    Methadone Screen, Urine Negative Negative    Barbiturates Screen,  Urine Negative Negative    Oxycodone Screen, Urine Negative Negative    Propoxyphene Screen Negative Negative    Buprenorphine, Screen, Urine Negative Negative   Comprehensive Metabolic Panel    Specimen: Arm, Right; Blood   Result Value Ref Range    Glucose 190 (H) 65 - 99 mg/dL    BUN 19 6 - 20 mg/dL    Creatinine 1.15 0.76 - 1.27 mg/dL    Sodium 142 136 - 145 mmol/L    Potassium 3.9 3.5 - 5.2 mmol/L    Chloride 104 98 - 107 mmol/L    CO2 27.3 22.0 - 29.0 mmol/L    Calcium 9.6 8.6 - 10.5 mg/dL    Total Protein 7.1 6.0 - 8.5 g/dL    Albumin 4.5 3.5 - 5.2 g/dL    ALT (SGPT) 29 1 - 41 U/L    AST (SGOT) 23 1 - 40 U/L    Alkaline Phosphatase 79 39 - 117 U/L    Total Bilirubin 0.3 0.0 - 1.2 mg/dL    Globulin 2.6 gm/dL    A/G Ratio 1.7 g/dL    BUN/Creatinine Ratio 16.5 7.0 - 25.0    Anion Gap 10.7 5.0 - 15.0 mmol/L    eGFR 84.6 >60.0 mL/min/1.73   Hemoglobin A1c    Specimen: Arm, Right; Blood   Result Value Ref Range    Hemoglobin A1C 9.40 (H) 4.80 - 5.60 %   Lipid Panel    Specimen: Arm, Right; Blood   Result Value Ref Range    Total Cholesterol 135 0 - 200 mg/dL    Triglycerides 79 0 - 150 mg/dL    HDL Cholesterol 59 40 - 60 mg/dL    LDL Cholesterol  61 0 - 100 mg/dL    VLDL Cholesterol 15 5 - 40 mg/dL    LDL/HDL Ratio 1.02        Imaging:   No valid procedures specified.     Measures:   Advanced Care Planning:   Patient does not have an advance directive, information provided.    Smoking Cessation:   less than 3 minutes spent counseling Will try to cut down    Assessment / Plan      Assessment/Plan:   Diagnoses and all orders for this visit:    1. Type 2 diabetes mellitus with hyperglycemia, without long-term current use of insulin (Primary)   Hemoglobin A1c will be obtained today.  We will continue to adjust medicine to keep his hemoglobin A1c less than 7.0%  .-     Cancel: POC Glycosylated Hemoglobin (Hb A1C)  -     Hemoglobin A1c; Future  -     Hemoglobin A1c    2. Mixed hyperlipidemia   We will obtain lipid  profile make adjustments based on his findings.  We will just keep his LDL less than 70.  -     Lipid Panel; Future  -     Lipid Panel    3. Primary hypertension   Patient has had some orthostasis associated with the higher dose of lisinopril.  We will reduce the lisinopril down to 10 mg a day and we will have him observe.  If he has other issues or complaints further assessment treatment will be necessary.  -     lisinopril (PRINIVIL,ZESTRIL) 10 MG tablet; Take 1 tablet by mouth Daily.  Dispense: 90 tablet; Refill: 3  -     Comprehensive Metabolic Panel; Future  -     Comprehensive Metabolic Panel    4. Tobacco dependence   Patient does continue to smoke electronic cigarettes.  We will continue to encourage him to wean down on this habit as well.    5. Adult ADHD   Patient is doing well respect to tolerance of his ADHD medications.  He has not had any side effects including palpitations, anxiety, insomnia or weight loss.  We will continue on his current regimen and will monitor.  He understands risk and benefits of medication.      Follow Up:   Return in about 3 months (around 8/5/2023) for Recheck.      At [facility], we believe that sharing information builds trust and better relationships. You are receiving this note because you recently visited [facility]. It is possible you will see health information before a provider has talked with you about it. This kind of information can be easy to misunderstand. To help you fully understand what it means for your health, we urge you to discuss this note with your provider.    Steven Rivas MD  [facility]

## 2023-05-05 NOTE — PATIENT INSTRUCTIONS
Advance Care Planning and Advance Directives     You make decisions on a daily basis - decisions about where you want to live, your career, your home, your life. Perhaps one of the most important decisions you face is your choice for future medical care. Take time to talk with your family and your healthcare team and start planning today.  Advance Care Planning is a process that can help you:  Understand possible future healthcare decisions in light of your own experiences  Reflect on those decision in light of your goals and values  Discuss your decisions with those closest to you and the healthcare professionals that care for you  Make a plan by creating a document that reflects your wishes    Surrogate Decision Maker  In the event of a medical emergency, which has left you unable to communicate or to make your own decisions, you would need someone to make decisions for you.  It is important to discuss your preferences for medical treatment with this person while you are in good health.     Qualities of a surrogate decision maker:  Willing to take on this role and responsibility  Knows what you want for future medical care  Willing to follow your wishes even if they don't agree with them  Able to make difficult medical decisions under stressful circumstances    Advance Directives  These are legal documents you can create that will guide your healthcare team and decision maker(s) when needed. These documents can be stored in the electronic medical record.    Living Will - a legal document to guide your care if you have a terminal condition or a serious illness and are unable to communicate. States vary by statute in document names/types, but most forms may include one or more of the following:        -  Directions regarding life-prolonging treatments        -  Directions regarding artificially provided nutrition/hydration        -  Choosing a healthcare decision maker        -  Direction regarding organ/tissue  donation    Durable Power of  for Healthcare - this document names an -in-fact to make medical decisions for you, but it may also allow this person to make personal and financial decisions for you. Please seek the advice of an  if you need this type of document.    **Advance Directives are not required and no one may discriminate against you if you do not sign one.    Medical Orders  Many states allow specific forms/orders signed by your physician to record your wishes for medical treatment in your current state of health. This form, signed in personal communication with your physician, addresses resuscitation and other medical interventions that you may or may not want.      For more information or to schedule a time with a [facility] Advance Care Planning Facilitator contact: [email or web address] or call [phone] and someone will contact you directly.

## 2023-05-06 LAB
ALBUMIN SERPL-MCNC: 4.2 G/DL (ref 3.5–5.2)
ALBUMIN/GLOB SERPL: 1.4 G/DL
ALP SERPL-CCNC: 80 U/L (ref 39–117)
ALT SERPL W P-5'-P-CCNC: 23 U/L (ref 1–41)
ANION GAP SERPL CALCULATED.3IONS-SCNC: 8.5 MMOL/L (ref 5–15)
AST SERPL-CCNC: 12 U/L (ref 1–40)
BILIRUB SERPL-MCNC: <0.2 MG/DL (ref 0–1.2)
BUN SERPL-MCNC: 14 MG/DL (ref 6–20)
BUN/CREAT SERPL: 13.7 (ref 7–25)
CALCIUM SPEC-SCNC: 10.4 MG/DL (ref 8.6–10.5)
CHLORIDE SERPL-SCNC: 101 MMOL/L (ref 98–107)
CO2 SERPL-SCNC: 25.5 MMOL/L (ref 22–29)
CREAT SERPL-MCNC: 1.02 MG/DL (ref 0.76–1.27)
EGFRCR SERPLBLD CKD-EPI 2021: 97.7 ML/MIN/1.73
GLOBULIN UR ELPH-MCNC: 2.9 GM/DL
GLUCOSE SERPL-MCNC: 350 MG/DL (ref 65–99)
POTASSIUM SERPL-SCNC: 4.2 MMOL/L (ref 3.5–5.2)
PROT SERPL-MCNC: 7.1 G/DL (ref 6–8.5)
SODIUM SERPL-SCNC: 135 MMOL/L (ref 136–145)

## 2023-05-08 RX ORDER — GLUCOSAMINE HCL/CHONDROITIN SU 500-400 MG
1 CAPSULE ORAL 3 TIMES DAILY
Qty: 100 EACH | Refills: 11 | Status: SHIPPED | OUTPATIENT
Start: 2023-05-08

## 2023-06-05 ENCOUNTER — TELEPHONE (OUTPATIENT)
Dept: FAMILY MEDICINE CLINIC | Facility: CLINIC | Age: 38
End: 2023-06-05
Payer: COMMERCIAL

## 2023-06-05 DIAGNOSIS — F90.9 ADULT ADHD: ICD-10-CM

## 2023-06-05 RX ORDER — DEXTROAMPHETAMINE SACCHARATE, AMPHETAMINE ASPARTATE, DEXTROAMPHETAMINE SULFATE AND AMPHETAMINE SULFATE 7.5; 7.5; 7.5; 7.5 MG/1; MG/1; MG/1; MG/1
0.5 TABLET ORAL 2 TIMES DAILY
Qty: 30 TABLET | Refills: 0 | Status: SHIPPED | OUTPATIENT
Start: 2023-06-05

## 2023-06-05 NOTE — TELEPHONE ENCOUNTER
Caller: Colten Ogden    Relationship: Self    Best call back number: [phone]       Requested Prescriptions:   Requested Prescriptions     Pending Prescriptions Disp Refills    amphetamine-dextroamphetamine (ADDERALL) 30 MG tablet 30 tablet 0     Sig: Take 0.5 tablets by mouth 2 (Two) Times a Day.        [address] [facility] [phone]  - [phone]     Last office visit with prescribing clinician: 5/5/2023   Last telemedicine visit with prescribing clinician: Visit date not found   Next office visit with prescribing clinician: 8/7/2023     Additional details provided by patient: PATIENT HAS ONE PILL REMAINING    Does the patient have less than a 3 day supply:  [x] Yes  [] No    Would you like a call back once the refill request has been completed: [] Yes [] No    If the office needs to give you a call back, can they leave a voicemail: [] Yes [] No    Sammy Cross   06/05/23 12:25 EDT

## 2023-08-02 DIAGNOSIS — F90.9 ADULT ADHD: ICD-10-CM

## 2023-08-02 RX ORDER — DEXTROAMPHETAMINE SACCHARATE, AMPHETAMINE ASPARTATE, DEXTROAMPHETAMINE SULFATE AND AMPHETAMINE SULFATE 7.5; 7.5; 7.5; 7.5 MG/1; MG/1; MG/1; MG/1
15 TABLET ORAL 2 TIMES DAILY
Qty: 30 TABLET | Refills: 0 | Status: SHIPPED | OUTPATIENT
Start: 2023-08-02

## 2023-09-05 DIAGNOSIS — F90.9 ADULT ADHD: ICD-10-CM

## 2023-09-05 RX ORDER — DEXTROAMPHETAMINE SACCHARATE, AMPHETAMINE ASPARTATE, DEXTROAMPHETAMINE SULFATE AND AMPHETAMINE SULFATE 7.5; 7.5; 7.5; 7.5 MG/1; MG/1; MG/1; MG/1
15 TABLET ORAL 2 TIMES DAILY
Qty: 30 TABLET | Refills: 0 | Status: SHIPPED | OUTPATIENT
Start: 2023-09-05

## 2023-09-05 NOTE — TELEPHONE ENCOUNTER
Caller: Colten Ogden    Relationship: Self    Best call back number: [phone]     Requested Prescriptions:   Requested Prescriptions     Pending Prescriptions Disp Refills    amphetamine-dextroamphetamine (ADDERALL) 30 MG tablet 30 tablet 0     Sig: Take 0.5 tablets by mouth 2 (Two) Times a Day.        [address] [facility] - [phone]  - [phone] FX     Last office visit with prescribing clinician: 5/5/2023   Last telemedicine visit with prescribing clinician: Visit date not found   Next office visit with prescribing clinician: 10/2/2023     Additional details provided by patient: PATIENT IS OUT OF MEDICATION    Does the patient have less than a 3 day supply:  [x] Yes  [] No    Would you like a call back once the refill request has been completed: [] Yes [x] No    If the office needs to give you a call back, can they leave a voicemail: [] Yes [x] No    Sammy Galeana Rep   09/05/23 14:01 EDT

## 2023-10-02 ENCOUNTER — OFFICE VISIT (OUTPATIENT)
Dept: FAMILY MEDICINE CLINIC | Facility: CLINIC | Age: 38
End: 2023-10-02
Payer: COMMERCIAL

## 2023-10-02 VITALS
RESPIRATION RATE: 16 BRPM | HEART RATE: 103 BPM | SYSTOLIC BLOOD PRESSURE: 134 MMHG | TEMPERATURE: 98.7 F | HEIGHT: 73 IN | WEIGHT: 186.4 LBS | DIASTOLIC BLOOD PRESSURE: 90 MMHG | OXYGEN SATURATION: 99 % | BODY MASS INDEX: 24.7 KG/M2

## 2023-10-02 DIAGNOSIS — F90.9 ADULT ADHD: ICD-10-CM

## 2023-10-02 DIAGNOSIS — E78.2 MIXED HYPERLIPIDEMIA: ICD-10-CM

## 2023-10-02 DIAGNOSIS — Z78.9 ELECTRONIC CIGARETTE USE: ICD-10-CM

## 2023-10-02 DIAGNOSIS — Z23 NEED FOR INFLUENZA VACCINATION: ICD-10-CM

## 2023-10-02 DIAGNOSIS — E11.65 TYPE 2 DIABETES MELLITUS WITH HYPERGLYCEMIA, WITHOUT LONG-TERM CURRENT USE OF INSULIN: Primary | ICD-10-CM

## 2023-10-02 DIAGNOSIS — I10 PRIMARY HYPERTENSION: ICD-10-CM

## 2023-10-02 DIAGNOSIS — F17.211 CIGARETTE NICOTINE DEPENDENCE IN REMISSION: ICD-10-CM

## 2023-10-02 LAB — HBA1C MFR BLD: 11.7 % (ref 4.8–5.6)

## 2023-10-02 PROCEDURE — 1159F MED LIST DOCD IN RCRD: CPT | Performed by: FAMILY MEDICINE

## 2023-10-02 PROCEDURE — 3080F DIAST BP >= 90 MM HG: CPT | Performed by: FAMILY MEDICINE

## 2023-10-02 PROCEDURE — 80053 COMPREHEN METABOLIC PANEL: CPT | Performed by: FAMILY MEDICINE

## 2023-10-02 PROCEDURE — 3075F SYST BP GE 130 - 139MM HG: CPT | Performed by: FAMILY MEDICINE

## 2023-10-02 PROCEDURE — 1160F RVW MEDS BY RX/DR IN RCRD: CPT | Performed by: FAMILY MEDICINE

## 2023-10-02 PROCEDURE — 3046F HEMOGLOBIN A1C LEVEL >9.0%: CPT | Performed by: FAMILY MEDICINE

## 2023-10-02 PROCEDURE — 83036 HEMOGLOBIN GLYCOSYLATED A1C: CPT | Performed by: FAMILY MEDICINE

## 2023-10-02 PROCEDURE — 80061 LIPID PANEL: CPT | Performed by: FAMILY MEDICINE

## 2023-10-02 PROCEDURE — 99214 OFFICE O/P EST MOD 30 MIN: CPT | Performed by: FAMILY MEDICINE

## 2023-10-02 RX ORDER — INFLUENZA A VIRUS A/BRISBANE/02/2018 IVR-190 (H1N1) ANTIGEN (FORMALDEHYDE INACTIVATED), INFLUENZA A VIRUS A/KANSAS/14/2017 X-327 (H3N2) ANTIGEN (FORMALDEHYDE INACTIVATED), INFLUENZA B VIRUS B/PHUKET/3073/2013 ANTIGEN (FORMALDEHYDE INACTIVATED), AND INFLUENZA B VIRUS B/MARYLAND/15/2016 BX-69A ANTIGEN (FORMALDEHYDE INACTIVATED) 15; 15; 15; 15 UG/.5ML; UG/.5ML; UG/.5ML; UG/.5ML
0.5 INJECTION, SUSPENSION INTRAMUSCULAR ONCE
Qty: 0.5 ML | Refills: 0 | Status: SHIPPED | OUTPATIENT
Start: 2023-10-02 | End: 2023-10-02

## 2023-10-02 RX ORDER — DEXTROAMPHETAMINE SACCHARATE, AMPHETAMINE ASPARTATE, DEXTROAMPHETAMINE SULFATE AND AMPHETAMINE SULFATE 7.5; 7.5; 7.5; 7.5 MG/1; MG/1; MG/1; MG/1
TABLET ORAL
Qty: 30 TABLET | Refills: 0 | Status: SHIPPED | OUTPATIENT
Start: 2023-10-02

## 2023-10-02 NOTE — PATIENT INSTRUCTIONS
Advance Care Planning and Advance Directives     You make decisions on a daily basis - decisions about where you want to live, your career, your home, your life. Perhaps one of the most important decisions you face is your choice for future medical care. Take time to talk with your family and your healthcare team and start planning today.  Advance Care Planning is a process that can help you:  Understand possible future healthcare decisions in light of your own experiences  Reflect on those decision in light of your goals and values  Discuss your decisions with those closest to you and the healthcare professionals that care for you  Make a plan by creating a document that reflects your wishes    Surrogate Decision Maker  In the event of a medical emergency, which has left you unable to communicate or to make your own decisions, you would need someone to make decisions for you.  It is important to discuss your preferences for medical treatment with this person while you are in good health.     Qualities of a surrogate decision maker:  Willing to take on this role and responsibility  Knows what you want for future medical care  Willing to follow your wishes even if they don't agree with them  Able to make difficult medical decisions under stressful circumstances    Advance Directives  These are legal documents you can create that will guide your healthcare team and decision maker(s) when needed. These documents can be stored in the electronic medical record.    Living Will - a legal document to guide your care if you have a terminal condition or a serious illness and are unable to communicate. States vary by statute in document names/types, but most forms may include one or more of the following:        -  Directions regarding life-prolonging treatments        -  Directions regarding artificially provided nutrition/hydration        -  Choosing a healthcare decision maker        -  Direction regarding organ/tissue  donation    Durable Power of  for Healthcare - this document names an -in-fact to make medical decisions for you, but it may also allow this person to make personal and financial decisions for you. Please seek the advice of an  if you need this type of document.    **Advance Directives are not required and no one may discriminate against you if you do not sign one.    Medical Orders  Many states allow specific forms/orders signed by your physician to record your wishes for medical treatment in your current state of health. This form, signed in personal communication with your physician, addresses resuscitation and other medical interventions that you may or may not want.      For more information or to schedule a time with a [facility] Advance Care Planning Facilitator contact: [email or web address] or call [phone] and someone will contact you directly.

## 2023-10-02 NOTE — PROGRESS NOTES
Follow Up Office Visit      Date: 10/02/2023   Patient Name: Colten Ogden  : 1985   MRN: [id number]     Chief Complaint:    Chief Complaint   Patient presents with    Follow-up     3 mth recheck        History of Present Illness: Colten Ogden is a 38 y.o. male who is here today for follow-up.  Patient's been doing relative well since last being seen except for continuation of shoulder pain.  Otherwise he has been doing well.  He has not been checking his blood sugars and states that he is doing better with respect to taking his medications.  He does not know of any high or low spells recently.  Patient has otherwise been doing relatively well.  He does try to take his blood pressure medicine on a daily basis.  He is doing well with Adderall currently.  No other issues have been noted as he does continue with his usual activity, appetite and sleep.  Patient denies any other cardiovascular, respiratory, gastrointestinal, urologic or neurologic complaints.    Subjective      Review of Systems:   Review of Systems   Constitutional:  Negative for activity change, appetite change and fatigue.   Respiratory:  Negative for cough and shortness of breath.    Cardiovascular:  Negative for chest pain, palpitations and leg swelling.   Gastrointestinal:  Negative for abdominal pain, blood in stool, constipation, diarrhea, nausea, vomiting, GERD and indigestion.   Genitourinary:  Negative for dysuria, flank pain, frequency and urgency.   Musculoskeletal:  Positive for arthralgias. Negative for myalgias.   Neurological:  Negative for dizziness, tremors, weakness, light-headedness, numbness, headache and memory problem.   Psychiatric/Behavioral:  Negative for sleep disturbance and depressed mood. The patient is not nervous/anxious.      I have reviewed the patients family history, social history, past medical history, past surgical history and have updated it as appropriate.     Medications:     Current Outpatient  Medications:     amphetamine-dextroamphetamine (ADDERALL) 30 MG tablet, Take 0.5 tablets by mouth 2 (Two) Times a Day., Disp: 30 tablet, Rfl: 0    atorvastatin (LIPITOR) 80 MG tablet, Take 1 tablet by mouth Daily. for cholesterol, Disp: 90 tablet, Rfl: 3    Blood Glucose Monitoring Suppl kit, 1 each 3 (Three) Times a Day., Disp: 1 each, Rfl: 0    Glucose Blood (Blood Glucose Test) strip, 1 each by In Vitro route 3 (Three) Times a Day., Disp: 100 each, Rfl: 11    Jardiance 25 MG tablet tablet, Take 1 tablet by mouth Daily., Disp: 90 tablet, Rfl: 3    Lancets (OneTouch Delica Plus [id number]) misc, 1 each by Other route Daily., Disp: , Rfl:     lisinopril (PRINIVIL,ZESTRIL) 10 MG tablet, Take 1 tablet by mouth Daily., Disp: 90 tablet, Rfl: 3    metFORMIN (GLUCOPHAGE) 1000 MG tablet, Take 1 tablet by mouth 2 (Two) Times a Day With Meals., Disp: 180 tablet, Rfl: 3    pioglitazone (Actos) 45 MG tablet, Take 1 tablet by mouth Daily., Disp: 90 tablet, Rfl: 3    Influenza Vac Split Quad (Fluzone Quadrivalent) 0.5 ML suspension injection, Inject 0.5 mL into the appropriate muscle as directed by prescriber 1 (One) Time for 1 dose., Disp: 0.5 mL, Rfl: 0    Allergies:   No Known Allergies    Immunizations:   Immunization History   Administered Date(s) Administered    COVID-19 (MODERNA) 1st,2nd,3rd Dose Monovalent 03/24/2021, 04/21/2021    COVID-19 (UNSPECIFIED) 03/24/2021, 04/21/2021    Fluzone (or Fluarix & Flulaval for VFC) >6mos 09/14/2018, 11/02/2022    Hep B, Adolescent or Pediatric 02/13/2002, 03/18/2002, 08/13/2002    Influenza Seasonal Injectable 09/25/2009    Influenza, Unspecified 09/25/2009, 11/02/2022    Pneumococcal Conjugate 20-Valent (PCV20) 02/06/2023    Td (TDVAX) 08/13/2002    Tdap 02/06/2023        Objective     Physical Exam: Please see above  Vital Signs:   Vitals:    10/02/23 0910 10/02/23 0912   BP: 150/90 134/90   BP Location: Left arm Right arm   Patient Position: Sitting Sitting   Cuff Size: Adult Adult  "  Pulse: 103    Resp: 16    Temp: 98.7 °F (37.1 °C)    TempSrc: Temporal    SpO2: 99%    Weight: 84.6 kg (186 lb 6.4 oz)    Height: 185.4 cm (73\")      Body mass index is 24.59 kg/m².  BMI is within normal parameters. No other follow-up for BMI required.       Physical Exam  Vitals and nursing note reviewed.   Constitutional:       Appearance: Normal appearance.   HENT:      Head: Normocephalic and atraumatic.      Nose: Nose normal.      Mouth/Throat:      Pharynx: Oropharynx is clear.   Eyes:      Extraocular Movements: Extraocular movements intact.      Pupils: Pupils are equal, round, and reactive to light.   Neck:      Thyroid: No thyroid mass or thyromegaly.      Trachea: Trachea normal.   Cardiovascular:      Rate and Rhythm: Normal rate and regular rhythm.      Pulses: Normal pulses. No decreased pulses.      Heart sounds: Normal heart sounds.   Pulmonary:      Effort: Pulmonary effort is normal.      Breath sounds: Normal breath sounds.   Abdominal:      General: Abdomen is flat. Bowel sounds are normal.      Palpations: Abdomen is soft.      Tenderness: There is no abdominal tenderness.   Musculoskeletal:      Cervical back: Neck supple.      Right lower leg: No edema.      Left lower leg: No edema.   Lymphadenopathy:      Cervical: No cervical adenopathy.   Skin:     General: Skin is warm and dry.   Neurological:      General: No focal deficit present.      Mental Status: He is alert and oriented to person, place, and time.      Sensory: Sensation is intact.      Motor: Motor function is intact.      Coordination: Coordination is intact.   Psychiatric:         Attention and Perception: Attention normal.         Mood and Affect: Mood normal.         Speech: Speech normal.         Behavior: Behavior normal.       Procedures    Results:   Labs:   Hemoglobin A1C   Date Value Ref Range Status   05/05/2023 12.50 (H) 4.80 - 5.60 % Final     TSH   Date Value Ref Range Status   11/02/2022 1.730 0.270 - 4.200 " uIU/mL Final        POCT Results (if applicable):   Results for orders placed or performed in visit on 05/05/23   Comprehensive Metabolic Panel    Specimen: Arm, Right; Blood   Result Value Ref Range    Glucose 350 (H) 65 - 99 mg/dL    BUN 14 6 - 20 mg/dL    Creatinine 1.02 0.76 - 1.27 mg/dL    Sodium 135 (L) 136 - 145 mmol/L    Potassium 4.2 3.5 - 5.2 mmol/L    Chloride 101 98 - 107 mmol/L    CO2 25.5 22.0 - 29.0 mmol/L    Calcium 10.4 8.6 - 10.5 mg/dL    Total Protein 7.1 6.0 - 8.5 g/dL    Albumin 4.2 3.5 - 5.2 g/dL    ALT (SGPT) 23 1 - 41 U/L    AST (SGOT) 12 1 - 40 U/L    Alkaline Phosphatase 80 39 - 117 U/L    Total Bilirubin <0.2 0.0 - 1.2 mg/dL    Globulin 2.9 gm/dL    A/G Ratio 1.4 g/dL    BUN/Creatinine Ratio 13.7 7.0 - 25.0    Anion Gap 8.5 5.0 - 15.0 mmol/L    eGFR 97.7 >60.0 mL/min/1.73   Hemoglobin A1c    Specimen: Arm, Right; Blood   Result Value Ref Range    Hemoglobin A1C 12.50 (H) 4.80 - 5.60 %   Lipid Panel    Specimen: Arm, Right; Blood   Result Value Ref Range    Total Cholesterol 152 0 - 200 mg/dL    Triglycerides 142 0 - 150 mg/dL    HDL Cholesterol 66 (H) 40 - 60 mg/dL    LDL Cholesterol  62 0 - 100 mg/dL    VLDL Cholesterol 24 5 - 40 mg/dL    LDL/HDL Ratio 0.87        Imaging:   No valid procedures specified.     Measures:   Advanced Care Planning:   Patient does not have an advance directive, information provided.    Smoking Cessation:   Non-smoker.  Uses electronic cigarette.    Assessment / Plan      Assessment/Plan:   Diagnoses and all orders for this visit:    1. Type 2 diabetes mellitus with hyperglycemia, without long-term current use of insulin (Primary)  Patient does not appear to be as compliant as we had hoped with respect to taking his medications.  He has not been checking any blood sugars so we do not know where he has been running.  We will obtain hemoglobin A1c and most likely will need to initiate insulin.  We hopefully will be able to get the a continuous glucose monitor  approved at that time as well.  We will monitor and will treat accordingly.  We will schedule follow-up in 1 month's time to see how he is doing with the insulin if it does need to be initiated.  -     Hemoglobin A1c; Future  -     Hemoglobin A1c    2. Adult ADHD   Patient has been doing well with respect to his ADHD at present time.  He is tolerating the medication without side effects.  No adjustments are necessary currently.    3. Mixed hyperlipidemia  Patient will have a lipid profile obtained.  We will make adjustments in medications as necessary.  He does state that he has been doing better with respect to taking his Lipitor on a daily basis.  -     Lipid Panel; Future  -     Lipid Panel    4. Primary hypertension  Blood pressure is doing relatively well at present time.  No adjustments are necessary currently.  -     Comprehensive Metabolic Panel; Future  -     Comprehensive Metabolic Panel    5. Need for influenza vaccination  We will send a request to the pharmacy for flu shot.  -     Influenza Vac Split Quad (Fluzone Quadrivalent) 0.5 ML suspension injection; Inject 0.5 mL into the appropriate muscle as directed by prescriber 1 (One) Time for 1 dose.  Dispense: 0.5 mL; Refill: 0    6. Cigarette nicotine dependence in remission   Patient is no longer smoking cigarettes.  He does continue to use electronic cigarette.  We have encouraged him to wean off.    7. Electronic cigarette use        Follow Up:   Return in about 1 month (around 11/2/2023).      At [facility], we believe that sharing information builds trust and better relationships. You are receiving this note because you recently visited [facility]. It is possible you will see health information before a provider has talked with you about it. This kind of information can be easy to misunderstand. To help you fully understand what it means for your health, we urge you to discuss this note with your provider.    Steven Rivas MD  [facility]  [facility] [place]

## 2023-10-03 LAB
ALBUMIN SERPL-MCNC: 4.5 G/DL (ref 3.5–5.2)
ALBUMIN/GLOB SERPL: 1.7 G/DL
ALP SERPL-CCNC: 69 U/L (ref 39–117)
ALT SERPL W P-5'-P-CCNC: 18 U/L (ref 1–41)
ANION GAP SERPL CALCULATED.3IONS-SCNC: 8.8 MMOL/L (ref 5–15)
AST SERPL-CCNC: 13 U/L (ref 1–40)
BILIRUB SERPL-MCNC: <0.2 MG/DL (ref 0–1.2)
BUN SERPL-MCNC: 7 MG/DL (ref 6–20)
BUN/CREAT SERPL: 7.1 (ref 7–25)
CALCIUM SPEC-SCNC: 9.9 MG/DL (ref 8.6–10.5)
CHLORIDE SERPL-SCNC: 104 MMOL/L (ref 98–107)
CHOLEST SERPL-MCNC: 164 MG/DL (ref 0–200)
CO2 SERPL-SCNC: 24.2 MMOL/L (ref 22–29)
CREAT SERPL-MCNC: 0.98 MG/DL (ref 0.76–1.27)
EGFRCR SERPLBLD CKD-EPI 2021: 101.9 ML/MIN/1.73
GLOBULIN UR ELPH-MCNC: 2.6 GM/DL
GLUCOSE SERPL-MCNC: 311 MG/DL (ref 65–99)
HDLC SERPL-MCNC: 55 MG/DL (ref 40–60)
LDLC SERPL CALC-MCNC: 94 MG/DL (ref 0–100)
LDLC/HDLC SERPL: 1.7 {RATIO}
POTASSIUM SERPL-SCNC: 4 MMOL/L (ref 3.5–5.2)
PROT SERPL-MCNC: 7.1 G/DL (ref 6–8.5)
SODIUM SERPL-SCNC: 137 MMOL/L (ref 136–145)
TRIGL SERPL-MCNC: 77 MG/DL (ref 0–150)
VLDLC SERPL-MCNC: 15 MG/DL (ref 5–40)

## 2023-10-03 RX ORDER — INSULIN GLARGINE 100 [IU]/ML
10 INJECTION, SOLUTION SUBCUTANEOUS NIGHTLY
Qty: 15 ML | Refills: 0 | Status: SHIPPED | OUTPATIENT
Start: 2023-10-03

## 2023-10-05 ENCOUNTER — TELEPHONE (OUTPATIENT)
Dept: FAMILY MEDICINE CLINIC | Facility: CLINIC | Age: 38
End: 2023-10-05
Payer: COMMERCIAL

## 2023-10-05 RX ORDER — PEN NEEDLE, DIABETIC 30 GX3/16"
1 NEEDLE, DISPOSABLE MISCELLANEOUS
Qty: 100 EACH | Refills: 3 | Status: SHIPPED | OUTPATIENT
Start: 2023-10-05

## 2023-10-05 NOTE — TELEPHONE ENCOUNTER
Caller: Colten Ogden    Relationship: Self    Best call back number: [phone]     What medication are you requesting: PEN NEEDLES FOR SOLOSTAR    If a prescription is needed, what is your preferred pharmacy and phone number: [place], [place] - [facility] - [phone]  - [phone] FX     Additional notes:

## 2023-11-02 ENCOUNTER — OFFICE VISIT (OUTPATIENT)
Dept: FAMILY MEDICINE CLINIC | Facility: CLINIC | Age: 38
End: 2023-11-02
Payer: COMMERCIAL

## 2023-11-02 ENCOUNTER — REFERRAL TRIAGE (OUTPATIENT)
Dept: CASE MANAGEMENT | Facility: OTHER | Age: 38
End: 2023-11-02
Payer: COMMERCIAL

## 2023-11-02 VITALS
HEART RATE: 91 BPM | BODY MASS INDEX: 24.41 KG/M2 | TEMPERATURE: 99.6 F | SYSTOLIC BLOOD PRESSURE: 120 MMHG | OXYGEN SATURATION: 98 % | DIASTOLIC BLOOD PRESSURE: 78 MMHG | WEIGHT: 184.2 LBS | HEIGHT: 73 IN | RESPIRATION RATE: 16 BRPM

## 2023-11-02 DIAGNOSIS — Z23 NEED FOR INFLUENZA VACCINATION: ICD-10-CM

## 2023-11-02 DIAGNOSIS — E11.65 TYPE 2 DIABETES MELLITUS WITH HYPERGLYCEMIA, WITH LONG-TERM CURRENT USE OF INSULIN: ICD-10-CM

## 2023-11-02 DIAGNOSIS — F17.211 CIGARETTE NICOTINE DEPENDENCE IN REMISSION: ICD-10-CM

## 2023-11-02 DIAGNOSIS — E78.2 MIXED HYPERLIPIDEMIA: ICD-10-CM

## 2023-11-02 DIAGNOSIS — Z00.00 WELL ADULT EXAM: Primary | ICD-10-CM

## 2023-11-02 DIAGNOSIS — I10 PRIMARY HYPERTENSION: ICD-10-CM

## 2023-11-02 DIAGNOSIS — F90.9 ADULT ADHD: ICD-10-CM

## 2023-11-02 DIAGNOSIS — Z79.4 TYPE 2 DIABETES MELLITUS WITH HYPERGLYCEMIA, WITH LONG-TERM CURRENT USE OF INSULIN: ICD-10-CM

## 2023-11-02 DIAGNOSIS — Z78.9 ELECTRONIC CIGARETTE USE: ICD-10-CM

## 2023-11-02 LAB
EXPIRATION DATE: NORMAL
Lab: NORMAL
POC CREATININE URINE: 300
POC MICROALBUMIN URINE: 80

## 2023-11-02 RX ORDER — INSULIN GLARGINE 100 [IU]/ML
INJECTION, SOLUTION SUBCUTANEOUS
COMMUNITY
Start: 2023-10-03 | End: 2023-11-02

## 2023-11-02 RX ORDER — INSULIN GLARGINE 100 [IU]/ML
24 INJECTION, SOLUTION SUBCUTANEOUS NIGHTLY
Start: 2023-11-02

## 2023-11-02 RX ORDER — DEXTROAMPHETAMINE SACCHARATE, AMPHETAMINE ASPARTATE, DEXTROAMPHETAMINE SULFATE AND AMPHETAMINE SULFATE 7.5; 7.5; 7.5; 7.5 MG/1; MG/1; MG/1; MG/1
0.5 TABLET ORAL 2 TIMES DAILY
Qty: 30 TABLET | Refills: 0 | Status: SHIPPED | OUTPATIENT
Start: 2023-11-02

## 2023-11-02 NOTE — PROGRESS NOTES
Female Physical Note      Date: 2023   Patient Name: Colten Ogden  : 1985   MRN: [id number]     Chief Complaint:    Chief Complaint   Patient presents with    Follow-up     1 mth recheck    Hand Pain     Right pointer finger was pinched x 2-3 days ago.       History of Present Illness: Colten Ogden is a 38 y.o. male who is here today for their annual health maintenance and physical. ***      Subjective      Review of Systems:   Review of Systems   Constitutional:  Negative for activity change, appetite change and fatigue.   Respiratory:  Negative for cough and shortness of breath.    Cardiovascular:  Negative for chest pain, palpitations and leg swelling.   Gastrointestinal:  Negative for abdominal pain, blood in stool, constipation, diarrhea, nausea, vomiting, GERD and indigestion.   Genitourinary:  Negative for dysuria, flank pain, frequency, nocturia and urgency.   Musculoskeletal:  Negative for arthralgias and myalgias.   Neurological:  Negative for dizziness, tremors, syncope, weakness, light-headedness, numbness, memory problem and confusion.   Psychiatric/Behavioral:  Negative for sleep disturbance and depressed mood. The patient is not nervous/anxious.        Past Medical History, Social History, Family History and Care Team were all reviewed with patient and updated as appropriate.     Medications:     Current Outpatient Medications:     Insulin Glargine (BASAGLAR KWIKPEN) 100 UNIT/ML injection pen, Inject 24 Units under the skin into the appropriate area as directed Every Night., Disp: , Rfl:     amphetamine-dextroamphetamine (ADDERALL) 30 MG tablet, TAKE ONE-HALF TABLET BY MOUTH TWO TIMES A DAY, Disp: 30 tablet, Rfl: 0    atorvastatin (LIPITOR) 80 MG tablet, Take 1 tablet by mouth Daily. for cholesterol, Disp: 90 tablet, Rfl: 3    Blood Glucose Monitoring Suppl kit, 1 each 3 (Three) Times a Day., Disp: 1 each, Rfl: 0    Glucose Blood (Blood Glucose Test) strip, 1 each by In Vitro  route 3 (Three) Times a Day., Disp: 100 each, Rfl: 11    Insulin Pen Needle (Pen Needles) 32G X 4 MM misc, Use 1 each Daily., Disp: 100 each, Rfl: 3    Jardiance 25 MG tablet tablet, Take 1 tablet by mouth Daily., Disp: 90 tablet, Rfl: 3    Lancets (OneTouch Delica Plus [id number]) misc, 1 each by Other route Daily., Disp: , Rfl:     lisinopril (PRINIVIL,ZESTRIL) 10 MG tablet, Take 1 tablet by mouth Daily., Disp: 90 tablet, Rfl: 3    metFORMIN (GLUCOPHAGE) 1000 MG tablet, Take 1 tablet by mouth 2 (Two) Times a Day With Meals., Disp: 180 tablet, Rfl: 3    pioglitazone (Actos) 45 MG tablet, Take 1 tablet by mouth Daily., Disp: 90 tablet, Rfl: 3    Allergies:   No Known Allergies    Immunizations:  Health Maintenance Summary            Overdue - INFLUENZA VACCINE (Yearly - August to March) Overdue since 8/1/2023 11/02/2022  Imm Admin: FluLaval/Fluzone >6mos    11/02/2022  Imm Admin: Influenza, Unspecified    09/14/2018  Imm Admin: FluLaval/Fluzone >6mos    09/25/2009  Imm Admin: Influenza, Unspecified              Overdue - DIABETIC EYE EXAM (Yearly) Overdue since 11/1/2023 11/01/2022  Done              Overdue - ANNUAL PHYSICAL (Yearly) Overdue since 11/2/2023 11/02/2022  Registry Metric: Last Annual Physical              Ordered - URINE MICROALBUMIN (Yearly) Ordered on 11/2/2023 11/02/2022  Microalbumin, Urine component of MicroAlbumin, Urine, Random - Urine, Clean Catch              Postponed - COVID-19 Vaccine (5 - 2023-24 season) Postponed until 12/12/2112      10/02/2023  Postponed until 12/12/2112 by Steven Rivas MD (Patient Refused)    02/06/2023  Postponed until 2/25/2024 by Steven Rivas MD (Pending event - Advised.)    04/21/2021  Imm Admin: COVID-19 (UNSPECIFIED)    04/21/2021  Imm Admin: COVID-19 (MODERNA) 1st,2nd,3rd Dose Monovalent    03/24/2021  Imm Admin: COVID-19 (UNSPECIFIED)    Only the first 5 history entries have been loaded, but more history exists.               DIABETIC FOOT EXAM (Yearly) Next due on 2/6/2024 02/06/2023  Done              HEMOGLOBIN A1C (Every 6 Months) Next due on 4/2/2024      10/02/2023  Hemoglobin A1C component of Hemoglobin A1c    05/05/2023  Hemoglobin A1C component of Hemoglobin A1c    02/06/2023  Hemoglobin A1C component of Hemoglobin A1c    11/02/2022  Hemoglobin A1C component of Hemoglobin A1c              LIPID PANEL (Yearly) Next due on 10/2/2024      10/02/2023  Lipid Panel    05/05/2023  Lipid Panel    02/06/2023  Lipid Panel    11/02/2022  Lipid Panel              TDAP/TD VACCINES (3 - Td or Tdap) Next due on 2/6/2033 02/06/2023  Postponed until 2/6/2024 by Steven Rivas MD (Pending event - Advised.)    02/06/2023  Imm Admin: Tdap    08/13/2002  Imm Admin: Td              Hepatitis B (Series Information) Completed      08/13/2002  Imm Admin: Hep B, Adolescent or Pediatric    03/18/2002  Imm Admin: Hep B, Adolescent or Pediatric    02/13/2002  Imm Admin: Hep B, Adolescent or Pediatric              HEPATITIS C SCREENING  Completed      11/02/2022  Hepatitis C Antibody              Pneumococcal Vaccine 0-64 (Series Information) Completed      02/06/2023  Postponed until 2/6/2024 by Steven Rivas MD (Pending event - Advised.)    02/06/2023  Imm Admin: Pneumococcal Conjugate 20-Valent (PCV20)                     No orders of the defined types were placed in this encounter.       Colorectal Screening:   ***   Last Completed Colonoscopy       This patient has no relevant Health Maintenance data.          Pap:  ***   Last Completed Pap Smear       This patient has no relevant Health Maintenance data.           Mammogram:  ***   Last Completed Mammogram       This patient has no relevant Health Maintenance data.             CT for Smoker (Age 50-80, 20 pk yr):   ***  Bone Density/DEXA (Age 65 or high risk): {dexa:955473} ***  Hep C (Age 18-79 once): ***  HIV (Age 15-65 once): No results found for:  "\"HIV1X2\"  A1c:   Hemoglobin A1C   Date Value Ref Range Status   10/02/2023 11.70 (H) 4.80 - 5.60 % Final      Lipid panel:  No results found for: \"LIPIDEXCLUSI\"    The ASCVD Risk score (Agatha MEEK, et al., 2019) failed to calculate for the following reasons:    The 2019 ASCVD risk score is only valid for ages 40 to 79    Dermatology: ***  Ophthalmologist: ***  Dentist: ***    Tobacco Use: Medium Risk (11/2/2023)    Patient History     Smoking Tobacco Use: Former     Smokeless Tobacco Use: Never     Passive Exposure: Past       Social History     Substance and Sexual Activity   Alcohol Use Not Currently    Comment: no etoh since 2019        Social History     Substance and Sexual Activity   Drug Use Not Currently        Diet/Physical activity:***    Sexual Health: *** contraception, *** attempting pregnancy   Menopause: ***  Menstrual Cycles: ***, last menstrual cycle: ***    Depression: PHQ-2 Depression Screening  PHQ-9 Total Score: 0     Measures:   Advanced Care Planning:   Patient does not have an advance directive, information provided.    Smoking Cessation:   Non-smoker.    Objective     Physical Exam:  Vital Signs:   Vitals:    11/02/23 1108   BP: 120/78   BP Location: Left arm   Patient Position: Sitting   Cuff Size: Adult   Pulse: 91   Resp: 16   Temp: 99.6 °F (37.6 °C)   TempSrc: Temporal   SpO2: 98%   Weight: 83.6 kg (184 lb 3.2 oz)   Height: 185.4 cm (73\")     Body mass index is 24.3 kg/m².     Physical Exam  Vitals and nursing note reviewed.   Constitutional:       Appearance: Normal appearance.   HENT:      Head: Normocephalic and atraumatic.      Nose: Nose normal.      Mouth/Throat:      Pharynx: Oropharynx is clear.   Eyes:      Extraocular Movements: Extraocular movements intact.      Pupils: Pupils are equal, round, and reactive to light.   Neck:      Thyroid: No thyroid mass or thyromegaly.      Trachea: Trachea normal.   Cardiovascular:      Rate and Rhythm: Normal rate and regular rhythm.      " Pulses: Normal pulses. No decreased pulses.      Heart sounds: Normal heart sounds.   Pulmonary:      Effort: Pulmonary effort is normal.      Breath sounds: Normal breath sounds.   Abdominal:      General: Abdomen is flat. Bowel sounds are normal.      Palpations: Abdomen is soft.      Tenderness: There is no abdominal tenderness.   Musculoskeletal:      Cervical back: Neck supple.      Right lower leg: No edema.      Left lower leg: No edema.   Lymphadenopathy:      Cervical: No cervical adenopathy.   Skin:     General: Skin is warm and dry.   Neurological:      General: No focal deficit present.      Mental Status: He is alert and oriented to person, place, and time.      Sensory: Sensation is intact.      Motor: Motor function is intact.      Coordination: Coordination is intact.   Psychiatric:         Attention and Perception: Attention normal.         Mood and Affect: Mood normal.         Speech: Speech normal.         Behavior: Behavior normal.         POCT Results (if applicable);   Results for orders placed or performed in visit on 10/02/23   Comprehensive Metabolic Panel    Specimen: Arm, Right; Blood   Result Value Ref Range    Glucose 311 (H) 65 - 99 mg/dL    BUN 7 6 - 20 mg/dL    Creatinine 0.98 0.76 - 1.27 mg/dL    Sodium 137 136 - 145 mmol/L    Potassium 4.0 3.5 - 5.2 mmol/L    Chloride 104 98 - 107 mmol/L    CO2 24.2 22.0 - 29.0 mmol/L    Calcium 9.9 8.6 - 10.5 mg/dL    Total Protein 7.1 6.0 - 8.5 g/dL    Albumin 4.5 3.5 - 5.2 g/dL    ALT (SGPT) 18 1 - 41 U/L    AST (SGOT) 13 1 - 40 U/L    Alkaline Phosphatase 69 39 - 117 U/L    Total Bilirubin <0.2 0.0 - 1.2 mg/dL    Globulin 2.6 gm/dL    A/G Ratio 1.7 g/dL    BUN/Creatinine Ratio 7.1 7.0 - 25.0    Anion Gap 8.8 5.0 - 15.0 mmol/L    eGFR 101.9 >60.0 mL/min/1.73   Hemoglobin A1c    Specimen: Arm, Right; Blood   Result Value Ref Range    Hemoglobin A1C 11.70 (H) 4.80 - 5.60 %   Lipid Panel    Specimen: Arm, Right; Blood   Result Value Ref Range     Total Cholesterol 164 0 - 200 mg/dL    Triglycerides 77 0 - 150 mg/dL    HDL Cholesterol 55 40 - 60 mg/dL    LDL Cholesterol  94 0 - 100 mg/dL    VLDL Cholesterol 15 5 - 40 mg/dL    LDL/HDL Ratio 1.70         Procedures    Assessment / Plan      Assessment/Plan:   Diagnoses and all orders for this visit:    1. Well adult exam (Primary)    2. Type 2 diabetes mellitus with hyperglycemia, with long-term current use of insulin  -     POC Microalbumin  -     Insulin Glargine (BASAGLAR KWIKPEN) 100 UNIT/ML injection pen; Inject 24 Units under the skin into the appropriate area as directed Every Night.    3. Mixed hyperlipidemia    4. Adult ADHD    5. Primary hypertension    6. Need for influenza vaccination    7. Cigarette nicotine dependence in remission    8. Electronic cigarette use    9. Type 2 diabetes mellitus with hyperglycemia, without long-term current use of insulin         Healthcare Maintenance:  Counseling provided based on age appropriate USPSTF guidelines.  BMI is within normal parameters. No other follow-up for BMI required.    Colten Ogden voices understanding and acceptance of this advice and will call back with any further questions or concerns. AVS with preventive healthcare tips printed for patient.     Follow Up:   No follow-ups on file.    I have spent a total of *** min on reviewing test results/preparing to see patient, counseling patient, performing medically appropriate exam and documenting clinical information in the electronic or other health record.     At [facility], we believe that sharing information builds trust and better relationships. You are receiving this note because you recently visited [facility]. It is possible you will see health information before a provider has talked with you about it. This kind of information can be easy to misunderstand. To help you fully understand what it means for your health, we urge you to discuss this note with your provider.    Steven HENNESSY  MD Rob  [facility]

## 2023-11-02 NOTE — PROGRESS NOTES
Male Physical Note      Date: 2023   Patient Name: Colten Ogden  : 1985   MRN: [id number]     Chief Complaint:    Chief Complaint   Patient presents with    Follow-up     1 mth recheck    Hand Pain     Right pointer finger was pinched x 2-3 days ago.       History of Present Illness: Colten Ogden is a 38 y.o. male who is here today for their annual health maintenance and physical.   Patient states that he has been given himself insulin at night has not been checking his sugars as often as he should.  He states that the lowest blood sugar he has seen has been 200.  He does not remember how high the highest level has been.  Patient has not been using his continuous glucose monitor.  Otherwise he does continue with his other medications as prescribed without side effects.  He has not had any complaints with respect to his ADHD medicine as this does appear to be doing well at present time.  He denies any change in his activity level.  Appetite has remained the same.  Sleep has not been an issue.  No other problems been noted as he denies any other cardiovascular, respiratory, gastrointestinal, urologic or neurologic complaints.      Subjective      Review of Systems:   Review of Systems   Constitutional:  Negative for activity change, appetite change and fatigue.   Respiratory:  Negative for cough and shortness of breath.    Cardiovascular:  Negative for chest pain, palpitations and leg swelling.   Gastrointestinal:  Negative for abdominal distention, abdominal pain, blood in stool, constipation, diarrhea, nausea, vomiting, GERD and indigestion.   Genitourinary:  Negative for dysuria, flank pain, frequency and urgency.   Musculoskeletal:  Negative for arthralgias and myalgias.   Neurological:  Negative for dizziness, tremors, syncope, weakness, light-headedness, numbness, headache and memory problem.   Psychiatric/Behavioral:  Negative for sleep disturbance and depressed mood. The patient is not  nervous/anxious.        Past Medical History, Social History, Family History and Care Team were all reviewed with patient and updated as appropriate.     Medications:     Current Outpatient Medications:     amphetamine-dextroamphetamine (ADDERALL) 30 MG tablet, Take 0.5 tablets by mouth 2 (Two) Times a Day., Disp: 30 tablet, Rfl: 0    Insulin Glargine (BASAGLAR KWIKPEN) 100 UNIT/ML injection pen, Inject 24 Units under the skin into the appropriate area as directed Every Night., Disp: , Rfl:     atorvastatin (LIPITOR) 80 MG tablet, Take 1 tablet by mouth Daily. for cholesterol, Disp: 90 tablet, Rfl: 3    Blood Glucose Monitoring Suppl kit, 1 each 3 (Three) Times a Day., Disp: 1 each, Rfl: 0    Continuous Blood Gluc  (FreeStyle Noemy 2 [place]) device, Use 1 each Daily., Disp: 1 each, Rfl: 11    Continuous Blood Gluc Sensor (FreeStyle Noemy 2 Sensor) misc, Use 1 each Every 14 (Fourteen) Days., Disp: 2 each, Rfl: 11    Glucose Blood (Blood Glucose Test) strip, 1 each by In Vitro route 3 (Three) Times a Day., Disp: 100 each, Rfl: 11    Insulin Pen Needle (Pen Needles) 32G X 4 MM misc, Use 1 each Daily., Disp: 100 each, Rfl: 3    Jardiance 25 MG tablet tablet, Take 1 tablet by mouth Daily., Disp: 90 tablet, Rfl: 3    Lancets (OneTouch Delica Plus [id number]) misc, 1 each by Other route Daily., Disp: , Rfl:     lisinopril (PRINIVIL,ZESTRIL) 10 MG tablet, Take 1 tablet by mouth Daily., Disp: 90 tablet, Rfl: 3    metFORMIN (GLUCOPHAGE) 1000 MG tablet, Take 1 tablet by mouth 2 (Two) Times a Day With Meals., Disp: 180 tablet, Rfl: 3    pioglitazone (Actos) 45 MG tablet, Take 1 tablet by mouth Daily., Disp: 90 tablet, Rfl: 3    Allergies:   No Known Allergies    Immunizations:  Health Maintenance Summary            Postponed - DIABETIC EYE EXAM (Yearly) Postponed until 12/21/2023 11/02/2023  Postponed until 12/21/2023 by Steven Rivas MD (Pending event - Advised.)    11/01/2022  Done               Postponed - INFLUENZA VACCINE (Yearly - August to March) Postponed until 3/31/2024      11/02/2023  Postponed until 3/31/2024 by Steven Rivas MD (Pending event - Advised.)    11/02/2022  Imm Admin: FluLaval/Fluzone >6mos    11/02/2022  Imm Admin: Influenza, Unspecified    09/14/2018  Imm Admin: FluLaval/Fluzone >6mos    09/25/2009  Imm Admin: Influenza, Unspecified    Only the first 5 history entries have been loaded, but more history exists.              Postponed - COVID-19 Vaccine (5 - 2023-24 season) Postponed until 12/12/2112      10/02/2023  Postponed until 12/12/2112 by Steven Rivas MD (Patient Refused)    02/06/2023  Postponed until 2/25/2024 by Steven Rivas MD (Pending event - Advised.)    04/21/2021  Imm Admin: COVID-19 (UNSPECIFIED)    04/21/2021  Imm Admin: COVID-19 (MODERNA) 1st,2nd,3rd Dose Monovalent    03/24/2021  Imm Admin: COVID-19 (UNSPECIFIED)    Only the first 5 history entries have been loaded, but more history exists.              DIABETIC FOOT EXAM (Yearly) Next due on 2/6/2024 02/06/2023  Done              HEMOGLOBIN A1C (Every 6 Months) Next due on 4/2/2024      10/02/2023  Hemoglobin A1C component of Hemoglobin A1c    05/05/2023  Hemoglobin A1C component of Hemoglobin A1c    02/06/2023  Hemoglobin A1C component of Hemoglobin A1c    11/02/2022  Hemoglobin A1C component of Hemoglobin A1c              LIPID PANEL (Yearly) Next due on 10/2/2024      10/02/2023  Lipid Panel    05/05/2023  Lipid Panel    02/06/2023  Lipid Panel    11/02/2022  Lipid Panel              ANNUAL PHYSICAL (Yearly) Next due on 11/2/2024 11/02/2023  Done    11/02/2022  Registry Metric: Last Annual Physical              URINE MICROALBUMIN (Yearly) Next due on 11/2/2024 11/02/2023  Microalbumin, Urine component of POC Microalbumin    11/02/2022  Microalbumin, Urine component of MicroAlbumin, Urine, Random - Urine, Clean Catch              TDAP/TD VACCINES (3 - Td or  "Tdap) Next due on 2/6/2033 02/06/2023  Postponed until 2/6/2024 by Steven Rivas MD (Pending event - Advised.)    02/06/2023  Imm Admin: Tdap    08/13/2002  Imm Admin: Td              Hepatitis B (Series Information) Completed      08/13/2002  Imm Admin: Hep B, Adolescent or Pediatric    03/18/2002  Imm Admin: Hep B, Adolescent or Pediatric    02/13/2002  Imm Admin: Hep B, Adolescent or Pediatric              HEPATITIS C SCREENING  Completed      11/02/2022  Hepatitis C Antibody              Pneumococcal Vaccine 0-64 (Series Information) Completed      02/06/2023  Postponed until 2/6/2024 by Steven Rivas MD (Pending event - Advised.)    02/06/2023  Imm Admin: Pneumococcal Conjugate 20-Valent (PCV20)                    No orders of the defined types were placed in this encounter.      Colorectal Screening:   Deferred.  Last Completed Colonoscopy       This patient has no relevant Health Maintenance data.          CT for Smoker (Age 50-80, 20pk yr within last 15 years): Deferred.  Bone Density/DEXA (high risk): Deferred.  Hep C (Age 18-79 once): Previously ordered.  HIV (Age 15-65 once) : Deferred.  PSA (Over age 50, C Level Recommendation): Deferred.  US Aorta (For male smokers, age 65): Deferred.  A1c:   Hemoglobin A1C   Date Value Ref Range Status   10/02/2023 11.70 (H) 4.80 - 5.60 % Final     Lipid panel: No results found for: \"LABLIPI\"    The ASCVD Risk score ([place] DK, et al., 2019) failed to calculate for the following reasons:    The 2019 ASCVD risk score is only valid for ages 40 to 79    Dermatology: Advise if necessary.  Ophthalmologist: Advised.  Dentist: Advised.    Tobacco Use: Medium Risk (11/2/2023)    Patient History     Smoking Tobacco Use: Former     Smokeless Tobacco Use: Never     Passive Exposure: Past       Social History     Substance and Sexual Activity   Alcohol Use Not Currently    Comment: no etoh since 2019        Social History     Substance and Sexual " "Activity   Drug Use Not Currently        Diet/Physical activity: Well-balanced diet/daily exercise.    Sexual health: No issues at present time.    PHQ-2 Depression Screening  PHQ-9 Total Score: 0      Measures:   Advanced Care Planning:   Patient does not have an advance directive, information provided.    Smoking Cessation:   Non-smoker.     Objective     Physical Exam:  Vital Signs:   Vitals:    11/02/23 1108   BP: 120/78   BP Location: Left arm   Patient Position: Sitting   Cuff Size: Adult   Pulse: 91   Resp: 16   Temp: 99.6 °F (37.6 °C)   TempSrc: Temporal   SpO2: 98%   Weight: 83.6 kg (184 lb 3.2 oz)   Height: 185.4 cm (73\")     Body mass index is 24.3 kg/m².     Physical Exam  Vitals and nursing note reviewed.   Constitutional:       Appearance: Normal appearance.   HENT:      Head: Normocephalic and atraumatic.      Nose: Nose normal.      Mouth/Throat:      Pharynx: Oropharynx is clear.   Eyes:      Extraocular Movements: Extraocular movements intact.      Pupils: Pupils are equal, round, and reactive to light.   Neck:      Thyroid: No thyroid mass or thyromegaly.      Trachea: Trachea normal.   Cardiovascular:      Rate and Rhythm: Normal rate and regular rhythm.      Pulses: Normal pulses. No decreased pulses.      Heart sounds: Normal heart sounds.   Pulmonary:      Effort: Pulmonary effort is normal.      Breath sounds: Normal breath sounds.   Abdominal:      General: Abdomen is flat. Bowel sounds are normal.      Palpations: Abdomen is soft.      Tenderness: There is no abdominal tenderness.   Musculoskeletal:      Cervical back: Neck supple.      Right lower leg: No edema.      Left lower leg: No edema.   Lymphadenopathy:      Cervical: No cervical adenopathy.   Skin:     General: Skin is warm and dry.   Neurological:      General: No focal deficit present.      Mental Status: He is alert and oriented to person, place, and time.      Sensory: Sensation is intact.      Motor: Motor function is intact. "      Coordination: Coordination is intact.   Psychiatric:         Attention and Perception: Attention normal.         Mood and Affect: Mood normal.         Speech: Speech normal.         Behavior: Behavior normal.          POCT Results (if applicable):   Results for orders placed or performed in visit on 11/02/23   POC Microalbumin    Specimen: Urine   Result Value Ref Range    Microalbumin, Urine 80     Creatinine, Urine 300     Lot Number [id number]     Expiration Date 07-        Procedures    Assessment / Plan      Assessment/Plan:   Diagnoses and all orders for this visit:    1. Well adult exam (Primary)   Patient did have a wellness exam performed today.  He did well with respect to his anxiety/depression symptoms.  We did discuss safety issues as well as anticipatory guidance.  We will continue to monitor and will treat accordingly.    2. Type 2 diabetes mellitus with hyperglycemia, with long-term current use of insulin  Patient did not bring recordings of his blood sugars as requested.  He states his lowest blood sugar was 200.  He has given 2 units of insulin but did not maintain this.  We have encouraged him to monitor his morning sugars and to slowly increase the insulin 2 units every 3 days as necessary to obtain a level of blood sugar of less than 120.  We will automatically increase his insulin up to 15 units tonight with adjustments as necessary.  We will also try to make arranges for a continuous glucose monitor to see if this will help with his compliance.  We have also requested that he keep his scheduled follow-up with the optometrist to assess for diabetic retinopathy.  -     POC Microalbumin  -     Insulin Glargine (BASAGLAR KWIKPEN) 100 UNIT/ML injection pen; Inject 24 Units under the skin into the appropriate area as directed Every Night.  -     Continuous Blood Gluc Sensor (FreeStyle Noemy 2 Sensor) misc; Use 1 each Every 14 (Fourteen) Days.  Dispense: 2 each; Refill: 11  -      Continuous Blood Gluc  (FreeStyle Noemy 2 [place]) device; Use 1 each Daily.  Dispense: 1 each; Refill: 11    3. Mixed hyperlipidemia   We will monitor his lipid profile.  Patient does continue to take his Lipitor without difficulty.  Our goal is for his LDL to 70.    4. Adult ADHD  Patient has done well with respect to his ADHD at present time.  He has been unable to get the extended release Adderall.  We will use the short acting at half tablet twice daily.  -     amphetamine-dextroamphetamine (ADDERALL) 30 MG tablet; Take 0.5 tablets by mouth 2 (Two) Times a Day.  Dispense: 30 tablet; Refill: 0    5. Primary hypertension   Blood pressure is doing well at present time.  We will consider new him on his current regiment will monitor and will treat accordingly.    6. Need for influenza vaccination   We have advised that he obtain a flu shot through the pharmacy.    7. Cigarette nicotine dependence in remission   Patient is no longer smoking.  He is using electronic cigarettes.  We have encouraged him to discontinue usage of this as well.    8. Electronic cigarette use         Healthcare Maintenance:  Counseling provided based on age appropriate USPSTF guidelines.  BMI is within normal parameters. No other follow-up for BMI required.    Colten Ogden voices understanding and acceptance of this advice and will call back with any further questions or concerns. AVS with preventive healthcare tips printed for patient.     Follow Up:   Return in about 4 weeks (around 11/30/2023).    At [facility], we believe that sharing information builds trust and better relationships. You are receiving this note because you recently visited [facility]. It is possible you will see health information before a provider has talked with you about it. This kind of information can be easy to misunderstand. To help you fully understand what it means for your health, we urge you to discuss this note with your provider.    Steven  GERHARD Rivas MD  [facility]

## 2023-11-06 ENCOUNTER — TELEPHONE (OUTPATIENT)
Dept: CASE MANAGEMENT | Facility: OTHER | Age: 38
End: 2023-11-06
Payer: COMMERCIAL

## 2023-11-06 NOTE — TELEPHONE ENCOUNTER
CORDELIA CM attempted outreach with patient to discuss ccm/hrcm. Left VM. Direct line [phone].

## 2023-11-08 ENCOUNTER — TELEPHONE (OUTPATIENT)
Dept: CASE MANAGEMENT | Facility: OTHER | Age: 38
End: 2023-11-08
Payer: COMMERCIAL

## 2023-11-08 NOTE — TELEPHONE ENCOUNTER
CORDELIA CM attempted outreach with patient to discuss ccm/hrcm. Not available. Direct line [phone].

## 2023-11-13 ENCOUNTER — TELEPHONE (OUTPATIENT)
Dept: CASE MANAGEMENT | Facility: OTHER | Age: 38
End: 2023-11-13
Payer: COMMERCIAL

## 2023-11-13 DIAGNOSIS — I10 PRIMARY HYPERTENSION: ICD-10-CM

## 2023-11-13 DIAGNOSIS — E11.9 TYPE 2 DIABETES MELLITUS WITHOUT COMPLICATION, WITHOUT LONG-TERM CURRENT USE OF INSULIN: Primary | ICD-10-CM

## 2023-11-13 NOTE — TELEPHONE ENCOUNTER
AMB CM attempted to notify patient to discuss ccm services. UTR x3 will close program at this time.

## 2023-11-24 RX ORDER — ATORVASTATIN CALCIUM 80 MG/1
80 TABLET, FILM COATED ORAL DAILY
Qty: 90 TABLET | Refills: 0 | Status: SHIPPED | OUTPATIENT
Start: 2023-11-24

## 2023-11-29 DIAGNOSIS — F90.9 ADULT ADHD: ICD-10-CM

## 2023-11-29 RX ORDER — DEXTROAMPHETAMINE SACCHARATE, AMPHETAMINE ASPARTATE, DEXTROAMPHETAMINE SULFATE AND AMPHETAMINE SULFATE 7.5; 7.5; 7.5; 7.5 MG/1; MG/1; MG/1; MG/1
0.5 TABLET ORAL 2 TIMES DAILY
Qty: 30 TABLET | Refills: 0 | Status: SHIPPED | OUTPATIENT
Start: 2023-11-29 | End: 2023-11-30 | Stop reason: SDUPTHER

## 2023-11-30 DIAGNOSIS — F90.9 ADULT ADHD: ICD-10-CM

## 2023-11-30 RX ORDER — DEXTROAMPHETAMINE SACCHARATE, AMPHETAMINE ASPARTATE, DEXTROAMPHETAMINE SULFATE AND AMPHETAMINE SULFATE 7.5; 7.5; 7.5; 7.5 MG/1; MG/1; MG/1; MG/1
0.5 TABLET ORAL 2 TIMES DAILY
Qty: 30 TABLET | Refills: 0 | Status: SHIPPED | OUTPATIENT
Start: 2023-11-30

## 2023-12-11 ENCOUNTER — OFFICE VISIT (OUTPATIENT)
Dept: FAMILY MEDICINE CLINIC | Facility: CLINIC | Age: 38
End: 2023-12-11
Payer: COMMERCIAL

## 2023-12-11 VITALS
DIASTOLIC BLOOD PRESSURE: 90 MMHG | WEIGHT: 191 LBS | RESPIRATION RATE: 18 BRPM | HEIGHT: 73 IN | HEART RATE: 102 BPM | BODY MASS INDEX: 25.31 KG/M2 | OXYGEN SATURATION: 95 % | SYSTOLIC BLOOD PRESSURE: 130 MMHG | TEMPERATURE: 98 F

## 2023-12-11 DIAGNOSIS — E78.2 MIXED HYPERLIPIDEMIA: ICD-10-CM

## 2023-12-11 DIAGNOSIS — Z79.4 TYPE 2 DIABETES MELLITUS WITH HYPERGLYCEMIA, WITH LONG-TERM CURRENT USE OF INSULIN: Primary | ICD-10-CM

## 2023-12-11 DIAGNOSIS — I10 PRIMARY HYPERTENSION: ICD-10-CM

## 2023-12-11 DIAGNOSIS — E11.65 TYPE 2 DIABETES MELLITUS WITH HYPERGLYCEMIA, WITH LONG-TERM CURRENT USE OF INSULIN: Primary | ICD-10-CM

## 2023-12-11 DIAGNOSIS — Z23 NEED FOR INFLUENZA VACCINATION: ICD-10-CM

## 2023-12-11 PROCEDURE — 3080F DIAST BP >= 90 MM HG: CPT | Performed by: FAMILY MEDICINE

## 2023-12-11 PROCEDURE — 1159F MED LIST DOCD IN RCRD: CPT | Performed by: FAMILY MEDICINE

## 2023-12-11 PROCEDURE — 3075F SYST BP GE 130 - 139MM HG: CPT | Performed by: FAMILY MEDICINE

## 2023-12-11 PROCEDURE — 99214 OFFICE O/P EST MOD 30 MIN: CPT | Performed by: FAMILY MEDICINE

## 2023-12-11 PROCEDURE — 3046F HEMOGLOBIN A1C LEVEL >9.0%: CPT | Performed by: FAMILY MEDICINE

## 2023-12-11 PROCEDURE — 1160F RVW MEDS BY RX/DR IN RCRD: CPT | Performed by: FAMILY MEDICINE

## 2023-12-11 RX ORDER — INSULIN GLARGINE 100 [IU]/ML
32 INJECTION, SOLUTION SUBCUTANEOUS NIGHTLY
Qty: 15 ML | Refills: 3 | Status: SHIPPED | OUTPATIENT
Start: 2023-12-11

## 2023-12-11 NOTE — PROGRESS NOTES
Follow Up Office Visit      Date: 2023   Patient Name: Colten Ogden  : 1985   MRN: [id number]     Chief Complaint:    Chief Complaint   Patient presents with    Diabetes    Follow-up    Hypertension       History of Present Illness: Colten Ogden is a 38 y.o. male who is here today for follow-up of his diabetes.  Patient states that he has worked his insulin up to 32 units at night.  His blood sugars have ranged in the 100-150 range.  He has not had any sugars above 200 except for the day he ate birthday cake.  He has been doing well otherwise.  He is continue with his usual activity, appetite and sleep.  He has not had any problems with his other medications currently.  He denies any other cardiovascular, respiratory, gastrointestinal, urologic or neurologic complaints.  He denies any hypoglycemic episodes.    Subjective      Review of Systems:   Review of Systems   Constitutional:  Negative for activity change, appetite change and fatigue.   Respiratory:  Negative for cough and shortness of breath.    Cardiovascular:  Negative for chest pain, palpitations and leg swelling.   Gastrointestinal:  Negative for abdominal distention, abdominal pain, blood in stool, constipation, diarrhea, nausea, vomiting, GERD and indigestion.   Genitourinary:  Negative for dysuria, flank pain, frequency and urgency.   Musculoskeletal:  Negative for arthralgias and myalgias.   Neurological:  Negative for dizziness, tremors, syncope, weakness, light-headedness, headache and memory problem.   Psychiatric/Behavioral:  Negative for sleep disturbance and depressed mood. The patient is not nervous/anxious.        I have reviewed the patients family history, social history, past medical history, past surgical history and have updated it as appropriate.     Medications:     Current Outpatient Medications:     amphetamine-dextroamphetamine (ADDERALL) 30 MG tablet, Take 0.5 tablets by mouth 2 (Two) Times a Day., Disp: 30  tablet, Rfl: 0    atorvastatin (LIPITOR) 80 MG tablet, TAKE ONE TABLET BY MOUTH EVERY DAY FOR CHOLESTEROL, Disp: 90 tablet, Rfl: 0    Blood Glucose Monitoring Suppl kit, 1 each 3 (Three) Times a Day., Disp: 1 each, Rfl: 0    Continuous Blood Gluc  (FreeStyle Noemy 2 [place]) device, Use 1 each Daily., Disp: 1 each, Rfl: 11    Continuous Blood Gluc Sensor (FreeStyle Noemy 2 Sensor) misc, Use 1 each Every 14 (Fourteen) Days., Disp: 2 each, Rfl: 11    Glucose Blood (Blood Glucose Test) strip, 1 each by In Vitro route 3 (Three) Times a Day., Disp: 100 each, Rfl: 11    Insulin Glargine (BASAGLAR KWIKPEN) 100 UNIT/ML injection pen, Inject 32 Units under the skin into the appropriate area as directed Every Night., Disp: 15 mL, Rfl: 3    Insulin Pen Needle (Pen Needles) 32G X 4 MM misc, Use 1 each Daily., Disp: 100 each, Rfl: 3    Jardiance 25 MG tablet tablet, Take 1 tablet by mouth Daily., Disp: 90 tablet, Rfl: 3    Lancets (OneTouch Delica Plus [id number]) misc, 1 each by Other route Daily., Disp: , Rfl:     lisinopril (PRINIVIL,ZESTRIL) 10 MG tablet, Take 1 tablet by mouth Daily., Disp: 90 tablet, Rfl: 3    metFORMIN (GLUCOPHAGE) 1000 MG tablet, TAKE ONE TABLET BY MOUTH TWO TIMES A DAY WITH MEALS., Disp: 180 tablet, Rfl: 0    pioglitazone (Actos) 45 MG tablet, Take 1 tablet by mouth Daily., Disp: 90 tablet, Rfl: 3    influenza vac split quad (FLUZONE QUADRIVALENT) suspension IM suspension, Inject 0.5 mL into the appropriate muscle as directed by prescriber 1 (One) Time for 1 dose., Disp: 0.5 mL, Rfl: 0    Allergies:   No Known Allergies    Immunizations:   Immunization History   Administered Date(s) Administered    COVID-19 (MODERNA) 1st,2nd,3rd Dose Monovalent 03/24/2021, 04/21/2021    COVID-19 (UNSPECIFIED) 03/24/2021, 04/21/2021    Fluzone (or Fluarix & Flulaval for VFC) >6mos 09/14/2018, 11/02/2022    Hep B, Adolescent or Pediatric 02/13/2002, 03/18/2002, 08/13/2002    Influenza Seasonal Injectable 09/25/2009  "   Influenza, Unspecified 09/25/2009, 11/02/2022    Pneumococcal Conjugate 20-Valent (PCV20) 02/06/2023    Td (TDVAX) 08/13/2002    Tdap 02/06/2023        Objective     Physical Exam: Please see above  Vital Signs:   Vitals:    12/11/23 1323   BP: 130/90   BP Location: Right arm   Patient Position: Sitting   Cuff Size: Adult   Pulse: 102   Resp: 18   Temp: 98 °F (36.7 °C)   TempSrc: Temporal   SpO2: 95%   Weight: 86.6 kg (191 lb)   Height: 185.4 cm (72.99\")     Body mass index is 25.2 kg/m².  BMI is >= 25 and <30. (Overweight) The following options were offered after discussion;: weight loss educational material (shared in after visit summary) and exercise counseling/recommendations       Physical Exam  Vitals and nursing note reviewed.   Constitutional:       Appearance: Normal appearance.   HENT:      Head: Normocephalic and atraumatic.      Nose: Nose normal.      Mouth/Throat:      Pharynx: Oropharynx is clear.   Eyes:      Extraocular Movements: Extraocular movements intact.      Pupils: Pupils are equal, round, and reactive to light.   Neck:      Thyroid: No thyroid mass or thyromegaly.      Trachea: Trachea normal.   Cardiovascular:      Rate and Rhythm: Normal rate and regular rhythm.      Pulses: Normal pulses. No decreased pulses.      Heart sounds: Normal heart sounds.   Pulmonary:      Effort: Pulmonary effort is normal.      Breath sounds: Normal breath sounds.   Abdominal:      General: Abdomen is flat. Bowel sounds are normal.      Palpations: Abdomen is soft.      Tenderness: There is no abdominal tenderness.   Musculoskeletal:      Cervical back: Neck supple.      Right lower leg: No edema.      Left lower leg: No edema.   Lymphadenopathy:      Cervical: No cervical adenopathy.   Skin:     General: Skin is warm and dry.   Neurological:      General: No focal deficit present.      Mental Status: He is alert and oriented to person, place, and time.      Sensory: Sensation is intact.      Motor: Motor " function is intact.      Coordination: Coordination is intact.   Psychiatric:         Attention and Perception: Attention normal.         Mood and Affect: Mood normal.         Speech: Speech normal.         Behavior: Behavior normal.         Procedures    Results:   Labs:   Hemoglobin A1C   Date Value Ref Range Status   10/02/2023 11.70 (H) 4.80 - 5.60 % Final     TSH   Date Value Ref Range Status   11/02/2022 1.730 0.270 - 4.200 uIU/mL Final        POCT Results (if applicable):   Results for orders placed or performed in visit on 11/02/23   POC Microalbumin    Specimen: Urine   Result Value Ref Range    Microalbumin, Urine 80     Creatinine, Urine 300     Lot Number [id number]     Expiration Date 07-        Imaging:   No valid procedures specified.     Measures:   Advanced Care Planning:   Patient does not have an advance directive, information provided.    Smoking Cessation:   Non-smoker.    Assessment / Plan      Assessment/Plan:   Diagnoses and all orders for this visit:    1. Type 2 diabetes mellitus with hyperglycemia, with long-term current use of insulin (Primary)  His blood sugars appear to be doing better with respect to his current regimen.  He is not taking 32 units of insulin daily.  We will continue with his current regiment and we will have him increase to keep his blood sugars between 100 150 if they do worsen.  Hopefully his hemoglobin A1c will show improvement.  We may need to add a short acting insulin to the regiment if he does not show improvement in his hemoglobin A1c.  -     Insulin Glargine (BASAGLAR KWIKPEN) 100 UNIT/ML injection pen; Inject 32 Units under the skin into the appropriate area as directed Every Night.  Dispense: 15 mL; Refill: 3  -     Continuous Blood Gluc Sensor (FreeStyle Noemy 2 Sensor) misc; Use 1 each Every 14 (Fourteen) Days.  Dispense: 2 each; Refill: 11  -     Continuous Blood Gluc  (FreeStyle Noemy 2 [place]) device; Use 1 each Daily.  Dispense: 1  each; Refill: 11    2. Primary hypertension   Patient's blood pressure is still little bit on the high side but he believes that the numbers have been doing better at home.  We will continue with his current regiment and have advised that he keep a close eye on his blood pressure.    3. Mixed hyperlipidemia   Patient did have an elevated LDL.  He is continue with his higher dose of statin.  If he shows no improvement in his lipid profile we may need to add Zetia to the regimen.  Our goal was for his LDL to be less than 70.    4. Need for influenza vaccination  We have discussed the risk and benefits of the flu shot.  We will send a request to the pharmacy.  -     influenza vac split quad (FLUZONE QUADRIVALENT) suspension IM suspension; Inject 0.5 mL into the appropriate muscle as directed by prescriber 1 (One) Time for 1 dose.  Dispense: 0.5 mL; Refill: 0        Follow Up:   Return in about 4 weeks (around 1/8/2024).      At [facility], we believe that sharing information builds trust and better relationships. You are receiving this note because you recently visited [facility]. It is possible you will see health information before a provider has talked with you about it. This kind of information can be easy to misunderstand. To help you fully understand what it means for your health, we urge you to discuss this note with your provider.    Steven Rivas MD  [facility]

## 2023-12-14 ENCOUNTER — PRIOR AUTHORIZATION (OUTPATIENT)
Dept: FAMILY MEDICINE CLINIC | Facility: CLINIC | Age: 38
End: 2023-12-14
Payer: COMMERCIAL

## 2023-12-14 NOTE — TELEPHONE ENCOUNTER
BASAGLAR 100 UNIT/ML   KWIKPEN has been approved.  Prior Authorization Reference Number: [id number]  Medication Requested: BASAGLAR 100 UNIT/ML KWIKPEN  The authorization is effective from 12/14/2023 to 12/13/2024,

## 2023-12-14 NOTE — TELEPHONE ENCOUNTER
Prior Authorization Reference Number: [id number]  Medication Requested: FREESTYLE SANG 2 READER  The authorization is effective from 12/14/2023 to 12/13/2024, a

## 2024-01-02 DIAGNOSIS — F90.9 ADULT ADHD: ICD-10-CM

## 2024-01-02 RX ORDER — DEXTROAMPHETAMINE SACCHARATE, AMPHETAMINE ASPARTATE, DEXTROAMPHETAMINE SULFATE AND AMPHETAMINE SULFATE 7.5; 7.5; 7.5; 7.5 MG/1; MG/1; MG/1; MG/1
0.5 TABLET ORAL 2 TIMES DAILY
Qty: 30 TABLET | Refills: 0 | Status: SHIPPED | OUTPATIENT
Start: 2024-01-02

## 2024-01-27 DIAGNOSIS — E11.65 TYPE 2 DIABETES MELLITUS WITH HYPERGLYCEMIA, WITHOUT LONG-TERM CURRENT USE OF INSULIN: ICD-10-CM

## 2024-01-28 RX ORDER — PIOGLITAZONEHYDROCHLORIDE 45 MG/1
45 TABLET ORAL DAILY
Qty: 90 TABLET | Refills: 3 | Status: SHIPPED | OUTPATIENT
Start: 2024-01-28

## 2024-01-30 DIAGNOSIS — F90.9 ADULT ADHD: ICD-10-CM

## 2024-01-30 RX ORDER — DEXTROAMPHETAMINE SACCHARATE, AMPHETAMINE ASPARTATE, DEXTROAMPHETAMINE SULFATE AND AMPHETAMINE SULFATE 7.5; 7.5; 7.5; 7.5 MG/1; MG/1; MG/1; MG/1
0.5 TABLET ORAL 2 TIMES DAILY
Qty: 30 TABLET | Refills: 0 | Status: SHIPPED | OUTPATIENT
Start: 2024-01-30

## 2024-01-30 NOTE — TELEPHONE ENCOUNTER
Caller: Colten Ogden    Relationship: Self    Best call back number: [phone]     Requested Prescriptions:   Requested Prescriptions     Pending Prescriptions Disp Refills    amphetamine-dextroamphetamine (ADDERALL) 30 MG tablet 30 tablet 0     Sig: Take 0.5 tablets by mouth 2 (Two) Times a Day.        [address] [facility] - [phone]  - [phone] FX     Last office visit with prescribing clinician: 12/11/2023   Last telemedicine visit with prescribing clinician: Visit date not found   Next office visit with prescribing clinician: Visit date not found     Additional details provided by patient:     Does the patient have less than a 3 day supply:  [x] Yes  [] No    Would you like a call back once the refill request has been completed: [x] Yes [] No    If the office needs to give you a call back, can they leave a voicemail: [x] Yes [] No    Sammy Warren Rep   01/30/24 09:48 EST

## 2024-02-21 RX ORDER — ATORVASTATIN CALCIUM 80 MG/1
80 TABLET, FILM COATED ORAL DAILY
Qty: 30 TABLET | Refills: 0 | Status: SHIPPED | OUTPATIENT
Start: 2024-02-21

## 2024-03-05 ENCOUNTER — OFFICE VISIT (OUTPATIENT)
Dept: FAMILY MEDICINE CLINIC | Facility: CLINIC | Age: 39
End: 2024-03-05
Payer: COMMERCIAL

## 2024-03-05 ENCOUNTER — LAB (OUTPATIENT)
Dept: FAMILY MEDICINE CLINIC | Facility: CLINIC | Age: 39
End: 2024-03-05
Payer: COMMERCIAL

## 2024-03-05 VITALS
HEART RATE: 88 BPM | SYSTOLIC BLOOD PRESSURE: 124 MMHG | BODY MASS INDEX: 26.26 KG/M2 | WEIGHT: 199 LBS | TEMPERATURE: 98.7 F | OXYGEN SATURATION: 95 % | DIASTOLIC BLOOD PRESSURE: 76 MMHG

## 2024-03-05 DIAGNOSIS — E78.2 MIXED HYPERLIPIDEMIA: ICD-10-CM

## 2024-03-05 DIAGNOSIS — E11.65 TYPE 2 DIABETES MELLITUS WITH HYPERGLYCEMIA, WITHOUT LONG-TERM CURRENT USE OF INSULIN: Primary | ICD-10-CM

## 2024-03-05 DIAGNOSIS — I10 PRIMARY HYPERTENSION: ICD-10-CM

## 2024-03-05 DIAGNOSIS — F17.211 CIGARETTE NICOTINE DEPENDENCE IN REMISSION: ICD-10-CM

## 2024-03-05 DIAGNOSIS — M25.512 CHRONIC LEFT SHOULDER PAIN: ICD-10-CM

## 2024-03-05 DIAGNOSIS — E11.65 TYPE 2 DIABETES MELLITUS WITH HYPERGLYCEMIA, WITHOUT LONG-TERM CURRENT USE OF INSULIN: ICD-10-CM

## 2024-03-05 DIAGNOSIS — G89.29 CHRONIC LEFT SHOULDER PAIN: ICD-10-CM

## 2024-03-05 DIAGNOSIS — F90.9 ADULT ADHD: ICD-10-CM

## 2024-03-05 PROCEDURE — 80053 COMPREHEN METABOLIC PANEL: CPT | Performed by: FAMILY MEDICINE

## 2024-03-05 PROCEDURE — 83036 HEMOGLOBIN GLYCOSYLATED A1C: CPT | Performed by: FAMILY MEDICINE

## 2024-03-05 PROCEDURE — 99214 OFFICE O/P EST MOD 30 MIN: CPT | Performed by: FAMILY MEDICINE

## 2024-03-05 PROCEDURE — 3074F SYST BP LT 130 MM HG: CPT | Performed by: FAMILY MEDICINE

## 2024-03-05 PROCEDURE — 1160F RVW MEDS BY RX/DR IN RCRD: CPT | Performed by: FAMILY MEDICINE

## 2024-03-05 PROCEDURE — 3046F HEMOGLOBIN A1C LEVEL >9.0%: CPT | Performed by: FAMILY MEDICINE

## 2024-03-05 PROCEDURE — 1159F MED LIST DOCD IN RCRD: CPT | Performed by: FAMILY MEDICINE

## 2024-03-05 PROCEDURE — 80061 LIPID PANEL: CPT | Performed by: FAMILY MEDICINE

## 2024-03-05 PROCEDURE — 3078F DIAST BP <80 MM HG: CPT | Performed by: FAMILY MEDICINE

## 2024-03-05 NOTE — PROGRESS NOTES
"    Follow Up Office Visit      Date: 2024   Patient Name: Colten Ogden  : 1985   MRN: [id number]     Chief Complaint:    Chief Complaint   Patient presents with    Diabetes    Med Refill    Shoulder Pain     Left shoulder pain radiates to chest , hx of accident xray 1 year ago        History of Present Illness: Colten Ogden is a 38 y.o. male who is here today for follow-up.  Patient has yet to  his continuous glucose monitor.  He has not been checking his blood sugars as suggested.  Patient also has not been using his insulin on a routine basis.  Apparently he had a recent \"bouts of depression\" and was not taking the majority of his medications.  This has since resolved and he has now resumed use of his medications.  He denies any side effects of his medications currently.  He has tried to continue with his usual activity, appetite and sleep.  Patient denies any other complaints currently including any other cardiovascular, respiratory, gastrointestinal, urologic or neurologic complaints.  Patient does continue to have pain in his left shoulder.  He wishes for this to be evaluated by an orthopedist.  Previous x-ray did reveal that he has some arthritis symptomatology.    Subjective      Review of Systems:   Review of Systems   Constitutional:  Negative for activity change, appetite change and fatigue.   Respiratory:  Negative for cough, shortness of breath and wheezing.    Cardiovascular:  Negative for chest pain, palpitations and leg swelling.   Gastrointestinal:  Negative for abdominal distention, abdominal pain, blood in stool, constipation, diarrhea, nausea, vomiting, GERD and indigestion.   Genitourinary:  Negative for dysuria, flank pain, frequency and urgency.   Musculoskeletal:  Negative for arthralgias and myalgias.   Neurological:  Negative for dizziness, weakness and memory problem.   Psychiatric/Behavioral:  Negative for sleep disturbance. The patient is not nervous/anxious.  "       I have reviewed the patients family history, social history, past medical history, past surgical history and have updated it as appropriate.     Medications:     Current Outpatient Medications:     amphetamine-dextroamphetamine (ADDERALL) 30 MG tablet, Take 0.5 tablets by mouth 2 (Two) Times a Day., Disp: 30 tablet, Rfl: 0    atorvastatin (LIPITOR) 80 MG tablet, TAKE ONE TABLET BY MOUTH EVERY DAY FOR CHOLESTEROL, Disp: 30 tablet, Rfl: 0    Blood Glucose Monitoring Suppl kit, 1 each 3 (Three) Times a Day., Disp: 1 each, Rfl: 0    Continuous Blood Gluc  (FreeStyle Noemy 2 Pocahontas) device, Use 1 each Daily., Disp: 1 each, Rfl: 11    Continuous Blood Gluc Sensor (FreeStyle Noemy 2 Sensor) misc, Use 1 each Every 14 (Fourteen) Days., Disp: 2 each, Rfl: 11    Glucose Blood (Blood Glucose Test) strip, 1 each by In Vitro route 3 (Three) Times a Day., Disp: 100 each, Rfl: 11    Insulin Glargine (BASAGLAR KWIKPEN) 100 UNIT/ML injection pen, Inject 32 Units under the skin into the appropriate area as directed Every Night., Disp: 15 mL, Rfl: 3    Insulin Pen Needle (Pen Needles) 32G X 4 MM misc, Use 1 each Daily., Disp: 100 each, Rfl: 3    Jardiance 25 MG tablet tablet, Take 1 tablet by mouth Daily., Disp: 90 tablet, Rfl: 3    Lancets (OneTouch Delica Plus [id number]) misc, 1 each by Other route Daily., Disp: , Rfl:     lisinopril (PRINIVIL,ZESTRIL) 10 MG tablet, Take 1 tablet by mouth Daily., Disp: 90 tablet, Rfl: 3    metFORMIN (GLUCOPHAGE) 1000 MG tablet, TAKE ONE TABLET BY MOUTH TWO TIMES A DAY WITH MEALS., Disp: 60 tablet, Rfl: 0    pioglitazone (ACTOS) 45 MG tablet, TAKE 1 TABLET BY MOUTH DAILY., Disp: 90 tablet, Rfl: 3    ezetimibe (Zetia) 10 MG tablet, Take 1 tablet by mouth Daily., Disp: 90 tablet, Rfl: 3    Allergies:   No Known Allergies    Immunizations:   Immunization History   Administered Date(s) Administered    COVID-19 (MODERNA) 1st,2nd,3rd Dose Monovalent 03/24/2021, 04/21/2021    COVID-19  (UNSPECIFIED) 03/24/2021, 04/21/2021    Fluzone (or Fluarix & Flulaval for VFC) >6mos 09/14/2018, 11/02/2022    Hep B, Adolescent or Pediatric 02/13/2002, 03/18/2002, 08/13/2002    Influenza Seasonal Injectable 09/25/2009    Influenza, Unspecified 09/25/2009, 11/02/2022    Pneumococcal Conjugate 20-Valent (PCV20) 02/06/2023    Td (TDVAX) 08/13/2002    Tdap 02/06/2023        Objective     Physical Exam: Please see above  Vital Signs:   Vitals:    03/05/24 1411   BP: 124/76   BP Location: Left arm   Patient Position: Sitting   Cuff Size: Adult   Pulse: 88   Temp: 98.7 °F (37.1 °C)   TempSrc: Temporal   SpO2: 95%   Weight: 90.3 kg (199 lb)   PainSc:   5   PainLoc: Shoulder     Body mass index is 26.26 kg/m².          Physical Exam  Vitals and nursing note reviewed.   Constitutional:       Appearance: Normal appearance.   HENT:      Head: Normocephalic and atraumatic.      Nose: Nose normal.      Mouth/Throat:      Pharynx: Oropharynx is clear.   Eyes:      Extraocular Movements: Extraocular movements intact.      Pupils: Pupils are equal, round, and reactive to light.   Neck:      Thyroid: No thyroid mass or thyromegaly.      Trachea: Trachea normal.   Cardiovascular:      Rate and Rhythm: Normal rate and regular rhythm.      Pulses: Normal pulses. No decreased pulses.           Dorsalis pedis pulses are 2+ on the right side and 2+ on the left side.        Posterior tibial pulses are 2+ on the right side and 2+ on the left side.      Heart sounds: Normal heart sounds.   Pulmonary:      Effort: Pulmonary effort is normal.      Breath sounds: Normal breath sounds.   Abdominal:      General: Abdomen is flat. Bowel sounds are normal.      Palpations: Abdomen is soft.      Tenderness: There is no abdominal tenderness.   Musculoskeletal:      Cervical back: Neck supple.      Right lower leg: No edema.      Left lower leg: No edema.      Right foot: No deformity.      Left foot: No deformity.   Feet:      Right foot:       Protective Sensation: 5 sites tested.  5 sites sensed.      Skin integrity: Skin integrity normal. No ulcer, skin breakdown, erythema or callus.      Toenail Condition: Right toenails are normal.      Left foot:      Protective Sensation: 5 sites tested.  5 sites sensed.      Skin integrity: Skin integrity normal. No ulcer, skin breakdown, erythema or callus.      Toenail Condition: Left toenails are normal.   Lymphadenopathy:      Cervical: No cervical adenopathy.   Skin:     General: Skin is warm and dry.   Neurological:      General: No focal deficit present.      Mental Status: He is alert and oriented to person, place, and time.      Sensory: Sensation is intact.      Motor: Motor function is intact.      Coordination: Coordination is intact.   Psychiatric:         Attention and Perception: Attention normal.         Mood and Affect: Mood normal.         Speech: Speech normal.         Behavior: Behavior normal.         Procedures    Results:   Labs:   Hemoglobin A1C   Date Value Ref Range Status   03/05/2024 11.70 (H) 4.80 - 5.60 % Final     TSH   Date Value Ref Range Status   11/02/2022 1.730 0.270 - 4.200 uIU/mL Final        POCT Results (if applicable):   Results for orders placed or performed in visit on 11/02/23   POC Microalbumin    Specimen: Urine   Result Value Ref Range    Microalbumin, Urine 80     Creatinine, Urine 300     Lot Number [id number]     Expiration Date 07-        Imaging:   No valid procedures specified.     Measures:   Advanced Care Planning:   Patient does not have an advance directive, information provided.    Smoking Cessation:   Non-smoker.  Patient does use electronic cigarettes.    Assessment / Plan      Assessment/Plan:   Diagnoses and all orders for this visit:    1. Type 2 diabetes mellitus with hyperglycemia, without long-term current use of insulin (Primary)  Patient has been noncompliant with his diabetes.  It does appear that he is having issues with the insulin and  he has yet to start the continuous glucose monitor.  We will encourage him to continue to be compliant as this will greatly reduce his future disease state.  We will obtain hemoglobin A1c will make adjustments based on these findings.  We did suggest that he may require insulin with each meal.  We will await the results and will pursue and treat accordingly.  Patient did have a diabetic foot exam that did not reveal any abnormality.  We have encouraged him to continue to keep close observation of his feet and clean on a daily basis.  Patient has also been recommended to keep his scheduled follow-up with the optometrist.  -     Hemoglobin A1c; Future    2. Adult ADHD   Patient does appear to tolerate his ADHD medicines without complaints.  We will continue on his current regimen as he is not having side effects.  We will further pursue and treat accordingly.    3. Primary hypertension  Blood pressure is doing well at present time.  He is tolerating his medications currently.  We will continue with his current regimen and will obtain a renal function test and will pursue and treat accordingly.  -     Comprehensive Metabolic Panel; Future    4. Mixed hyperlipidemia  We will obtain a lipid profile as well as liver function test.  We will adjust medications to keep his LDL less than 70.  -     Lipid Panel; Future  -     ezetimibe (Zetia) 10 MG tablet; Take 1 tablet by mouth Daily.  Dispense: 90 tablet; Refill: 3    5. Cigarette nicotine dependence in remission   Patient continues to use electronic cigarettes.  We have encouraged discontinuation.  We will provide medical assistance if required.    6. Chronic left shoulder pain  Patient does continue to have chronic left shoulder pain.  Previous x-ray did reveal that he had some underlying arthritis.  We have encouraged him to try physical therapy but he wishes to see an orthopedist first.  We will make arrangements for the orthopedist appointment, but most likely he will  have to have physical therapy.  We will await the recommendations by the orthopedist.  -     Ambulatory Referral to Orthopedic Surgery        Follow Up:   Return in about 3 months (around 6/5/2024).      At [facility], we believe that sharing information builds trust and better relationships. You are receiving this note because you recently visited [facility]. It is possible you will see health information before a provider has talked with you about it. This kind of information can be easy to misunderstand. To help you fully understand what it means for your health, we urge you to discuss this note with your provider.    Steven Rivas MD  [facility]

## 2024-03-06 LAB
ALBUMIN SERPL-MCNC: 4.4 G/DL (ref 3.5–5.2)
ALBUMIN/GLOB SERPL: 1.8 G/DL
ALP SERPL-CCNC: 77 U/L (ref 39–117)
ALT SERPL W P-5'-P-CCNC: 16 U/L (ref 1–41)
ANION GAP SERPL CALCULATED.3IONS-SCNC: 9.5 MMOL/L (ref 5–15)
AST SERPL-CCNC: 10 U/L (ref 1–40)
BILIRUB SERPL-MCNC: 0.4 MG/DL (ref 0–1.2)
BUN SERPL-MCNC: 14 MG/DL (ref 6–20)
BUN/CREAT SERPL: 15.7 (ref 7–25)
CALCIUM SPEC-SCNC: 9.5 MG/DL (ref 8.6–10.5)
CHLORIDE SERPL-SCNC: 104 MMOL/L (ref 98–107)
CHOLEST SERPL-MCNC: 176 MG/DL (ref 0–200)
CO2 SERPL-SCNC: 25.5 MMOL/L (ref 22–29)
CREAT SERPL-MCNC: 0.89 MG/DL (ref 0.76–1.27)
EGFRCR SERPLBLD CKD-EPI 2021: 113.2 ML/MIN/1.73
GLOBULIN UR ELPH-MCNC: 2.4 GM/DL
GLUCOSE SERPL-MCNC: 228 MG/DL (ref 65–99)
HBA1C MFR BLD: 11.7 % (ref 4.8–5.6)
HDLC SERPL-MCNC: 53 MG/DL (ref 40–60)
LDLC SERPL CALC-MCNC: 110 MG/DL (ref 0–100)
LDLC/HDLC SERPL: 2.06 {RATIO}
POTASSIUM SERPL-SCNC: 4.3 MMOL/L (ref 3.5–5.2)
PROT SERPL-MCNC: 6.8 G/DL (ref 6–8.5)
SODIUM SERPL-SCNC: 139 MMOL/L (ref 136–145)
TRIGL SERPL-MCNC: 69 MG/DL (ref 0–150)
VLDLC SERPL-MCNC: 13 MG/DL (ref 5–40)

## 2024-03-06 RX ORDER — EZETIMIBE 10 MG/1
10 TABLET ORAL DAILY
Qty: 90 TABLET | Refills: 3 | Status: SHIPPED | OUTPATIENT
Start: 2024-03-06

## 2024-04-01 ENCOUNTER — OFFICE VISIT (OUTPATIENT)
Dept: FAMILY MEDICINE CLINIC | Facility: CLINIC | Age: 39
End: 2024-04-01
Payer: COMMERCIAL

## 2024-04-01 VITALS
DIASTOLIC BLOOD PRESSURE: 90 MMHG | SYSTOLIC BLOOD PRESSURE: 134 MMHG | OXYGEN SATURATION: 99 % | RESPIRATION RATE: 18 BRPM | HEIGHT: 73 IN | HEART RATE: 124 BPM | WEIGHT: 190 LBS | TEMPERATURE: 98.4 F | BODY MASS INDEX: 25.18 KG/M2

## 2024-04-01 DIAGNOSIS — M67.472 GANGLION CYST OF LEFT FOOT: ICD-10-CM

## 2024-04-01 DIAGNOSIS — E11.65 TYPE 2 DIABETES MELLITUS WITH HYPERGLYCEMIA, WITH LONG-TERM CURRENT USE OF INSULIN: Primary | ICD-10-CM

## 2024-04-01 DIAGNOSIS — I10 PRIMARY HYPERTENSION: ICD-10-CM

## 2024-04-01 DIAGNOSIS — E78.2 MIXED HYPERLIPIDEMIA: ICD-10-CM

## 2024-04-01 DIAGNOSIS — G89.29 CHRONIC LEFT SHOULDER PAIN: ICD-10-CM

## 2024-04-01 DIAGNOSIS — Z79.4 TYPE 2 DIABETES MELLITUS WITH HYPERGLYCEMIA, WITH LONG-TERM CURRENT USE OF INSULIN: Primary | ICD-10-CM

## 2024-04-01 DIAGNOSIS — F17.211 CIGARETTE NICOTINE DEPENDENCE IN REMISSION: ICD-10-CM

## 2024-04-01 DIAGNOSIS — M25.512 CHRONIC LEFT SHOULDER PAIN: ICD-10-CM

## 2024-04-01 DIAGNOSIS — F90.9 ADULT ADHD: ICD-10-CM

## 2024-04-01 PROCEDURE — 3080F DIAST BP >= 90 MM HG: CPT | Performed by: FAMILY MEDICINE

## 2024-04-01 PROCEDURE — 1160F RVW MEDS BY RX/DR IN RCRD: CPT | Performed by: FAMILY MEDICINE

## 2024-04-01 PROCEDURE — 3046F HEMOGLOBIN A1C LEVEL >9.0%: CPT | Performed by: FAMILY MEDICINE

## 2024-04-01 PROCEDURE — 99214 OFFICE O/P EST MOD 30 MIN: CPT | Performed by: FAMILY MEDICINE

## 2024-04-01 PROCEDURE — 3075F SYST BP GE 130 - 139MM HG: CPT | Performed by: FAMILY MEDICINE

## 2024-04-01 PROCEDURE — 1159F MED LIST DOCD IN RCRD: CPT | Performed by: FAMILY MEDICINE

## 2024-04-01 RX ORDER — DEXTROAMPHETAMINE SACCHARATE, AMPHETAMINE ASPARTATE, DEXTROAMPHETAMINE SULFATE AND AMPHETAMINE SULFATE 7.5; 7.5; 7.5; 7.5 MG/1; MG/1; MG/1; MG/1
0.5 TABLET ORAL 2 TIMES DAILY
Qty: 30 TABLET | Refills: 0 | Status: SHIPPED | OUTPATIENT
Start: 2024-04-01

## 2024-04-01 NOTE — PROGRESS NOTES
Follow Up Office Visit      Date: 2024   Patient Name: Colten Ogden  : 1985   MRN: [id number]     Chief Complaint:    Chief Complaint   Patient presents with    Follow-up    Diabetes    ADD    Foot Pain    Shoulder Pain       History of Present Illness: Colten Ogden is a 38 y.o. male who is here today for follow-up of his chronic medical conditions.  Patient has yet to start his continuous glucose monitor even though he has a in his possession.  He is also been having some difficulty with pain in his left shoulder and is about ready to undergo physical therapy.  He is also having enlargement of the ganglion cyst of his left foot that is causing pain.  He does continue to take his medications as prescribed.  He has not increased his insulin at present time but states that he is not eating any carbohydrates currently.  He is tolerating his medications currently.  He denies any change in his activity, appetite or sleep.  Patient denies any other cardiovascular, respiratory, gastrointestinal, urologic or neurologic complaints.    Subjective      Review of Systems:   Review of Systems   Constitutional:  Negative for activity change, appetite change and fatigue.   Respiratory:  Negative for cough and shortness of breath.    Cardiovascular:  Negative for chest pain, palpitations and leg swelling.   Gastrointestinal:  Negative for abdominal pain, blood in stool, constipation, diarrhea, nausea, vomiting, GERD and indigestion.   Genitourinary:  Negative for dysuria, flank pain, frequency and urgency.   Musculoskeletal:  Positive for arthralgias and joint swelling. Negative for myalgias.   Neurological:  Negative for dizziness, weakness, light-headedness, headache and memory problem.   Psychiatric/Behavioral:  Negative for sleep disturbance and depressed mood. The patient is not nervous/anxious.        I have reviewed the patients family history, social history, past medical history, past surgical  history and have updated it as appropriate.     Medications:     Current Outpatient Medications:     atorvastatin (LIPITOR) 80 MG tablet, TAKE ONE TABLET BY MOUTH EVERY DAY FOR CHOLESTEROL, Disp: 30 tablet, Rfl: 0    Blood Glucose Monitoring Suppl kit, 1 each 3 (Three) Times a Day., Disp: 1 each, Rfl: 0    Continuous Blood Gluc  (FreeStyle Noemy 2 Arvin) device, Use 1 each Daily., Disp: 1 each, Rfl: 11    Continuous Blood Gluc Sensor (FreeStyle Noemy 2 Sensor) misc, Use 1 each Every 14 (Fourteen) Days., Disp: 2 each, Rfl: 11    ezetimibe (Zetia) 10 MG tablet, Take 1 tablet by mouth Daily., Disp: 90 tablet, Rfl: 3    Glucose Blood (Blood Glucose Test) strip, 1 each by In Vitro route 3 (Three) Times a Day., Disp: 100 each, Rfl: 11    Insulin Glargine (BASAGLAR KWIKPEN) 100 UNIT/ML injection pen, Inject 32 Units under the skin into the appropriate area as directed Every Night., Disp: 15 mL, Rfl: 3    Insulin Pen Needle (Pen Needles) 32G X 4 MM misc, Use 1 each Daily., Disp: 100 each, Rfl: 3    Jardiance 25 MG tablet tablet, Take 1 tablet by mouth Daily., Disp: 90 tablet, Rfl: 3    Lancets (OneTouch Delica Plus [id number]) misc, 1 each by Other route Daily., Disp: , Rfl:     lisinopril (PRINIVIL,ZESTRIL) 10 MG tablet, Take 1 tablet by mouth Daily., Disp: 90 tablet, Rfl: 3    metFORMIN (GLUCOPHAGE) 1000 MG tablet, TAKE ONE TABLET BY MOUTH TWO TIMES A DAY WITH MEALS., Disp: 60 tablet, Rfl: 0    pioglitazone (ACTOS) 45 MG tablet, TAKE 1 TABLET BY MOUTH DAILY., Disp: 90 tablet, Rfl: 3    amphetamine-dextroamphetamine (ADDERALL) 30 MG tablet, Take 0.5 tablets by mouth 2 (Two) Times a Day., Disp: 30 tablet, Rfl: 0    Diclofenac Sodium (VOLTAREN) 1 % gel gel, Apply 4 g topically to the appropriate area as directed 4 (Four) Times a Day., Disp: 350 g, Rfl: 11    Allergies:   No Known Allergies    Immunizations:   Immunization History   Administered Date(s) Administered    COVID-19 (MODERNA) 1st,2nd,3rd Dose Monovalent  "03/24/2021, 04/21/2021    COVID-19 (UNSPECIFIED) 03/24/2021, 04/21/2021    Fluzone (or Fluarix & Flulaval for VFC) >6mos 09/14/2018, 11/02/2022    Hep B, Adolescent or Pediatric 02/13/2002, 03/18/2002, 08/13/2002    Influenza Seasonal Injectable 09/25/2009    Influenza, Unspecified 09/25/2009, 11/02/2022    Pneumococcal Conjugate 20-Valent (PCV20) 02/06/2023    Td (TDVAX) 08/13/2002    Tdap 02/06/2023        Objective     Physical Exam: Please see above  Vital Signs:   Vitals:    04/01/24 1701   BP: 134/90   BP Location: Left arm   Patient Position: Sitting   Cuff Size: Adult   Pulse: (!) 124   Resp: 18   Temp: 98.4 °F (36.9 °C)   TempSrc: Temporal   SpO2: 99%   Weight: 86.2 kg (190 lb)   Height: 185.4 cm (73\")     Body mass index is 25.07 kg/m².          Physical Exam  Vitals and nursing note reviewed.   Constitutional:       Appearance: Normal appearance.   HENT:      Head: Normocephalic and atraumatic.      Nose: Nose normal.      Mouth/Throat:      Pharynx: Oropharynx is clear.   Eyes:      Extraocular Movements: Extraocular movements intact.      Pupils: Pupils are equal, round, and reactive to light.   Neck:      Thyroid: No thyroid mass or thyromegaly.      Trachea: Trachea normal.   Cardiovascular:      Rate and Rhythm: Normal rate and regular rhythm.      Pulses: Normal pulses. No decreased pulses.      Heart sounds: Normal heart sounds.   Pulmonary:      Effort: Pulmonary effort is normal.      Breath sounds: Normal breath sounds.   Abdominal:      General: Abdomen is flat. Bowel sounds are normal.      Palpations: Abdomen is soft.      Tenderness: There is no abdominal tenderness.   Musculoskeletal:      Cervical back: Neck supple.      Right lower leg: No edema.      Left lower leg: No edema.   Lymphadenopathy:      Cervical: No cervical adenopathy.   Skin:     General: Skin is warm and dry.   Neurological:      General: No focal deficit present.      Mental Status: He is alert and oriented to person, " place, and time.      Sensory: Sensation is intact.      Motor: Motor function is intact.      Coordination: Coordination is intact.   Psychiatric:         Attention and Perception: Attention normal.         Mood and Affect: Mood normal.         Speech: Speech normal.         Behavior: Behavior normal.         Procedures    Results:   Labs:   Hemoglobin A1C   Date Value Ref Range Status   03/05/2024 11.70 (H) 4.80 - 5.60 % Final     TSH   Date Value Ref Range Status   11/02/2022 1.730 0.270 - 4.200 uIU/mL Final        POCT Results (if applicable):   Results for orders placed or performed in visit on 03/05/24   Comprehensive Metabolic Panel    Specimen: Arm, Right; Blood   Result Value Ref Range    Glucose 228 (H) 65 - 99 mg/dL    BUN 14 6 - 20 mg/dL    Creatinine 0.89 0.76 - 1.27 mg/dL    Sodium 139 136 - 145 mmol/L    Potassium 4.3 3.5 - 5.2 mmol/L    Chloride 104 98 - 107 mmol/L    CO2 25.5 22.0 - 29.0 mmol/L    Calcium 9.5 8.6 - 10.5 mg/dL    Total Protein 6.8 6.0 - 8.5 g/dL    Albumin 4.4 3.5 - 5.2 g/dL    ALT (SGPT) 16 1 - 41 U/L    AST (SGOT) 10 1 - 40 U/L    Alkaline Phosphatase 77 39 - 117 U/L    Total Bilirubin 0.4 0.0 - 1.2 mg/dL    Globulin 2.4 gm/dL    A/G Ratio 1.8 g/dL    BUN/Creatinine Ratio 15.7 7.0 - 25.0    Anion Gap 9.5 5.0 - 15.0 mmol/L    eGFR 113.2 >60.0 mL/min/1.73   Lipid Panel    Specimen: Arm, Right; Blood   Result Value Ref Range    Total Cholesterol 176 0 - 200 mg/dL    Triglycerides 69 0 - 150 mg/dL    HDL Cholesterol 53 40 - 60 mg/dL    LDL Cholesterol  110 (H) 0 - 100 mg/dL    VLDL Cholesterol 13 5 - 40 mg/dL    LDL/HDL Ratio 2.06    Hemoglobin A1c    Specimen: Arm, Right; Blood   Result Value Ref Range    Hemoglobin A1C 11.70 (H) 4.80 - 5.60 %       Imaging:   No valid procedures specified.     Measures:   Advanced Care Planning:   Patient does not have an advance directive, information provided.    Smoking Cessation:   less than 3 minutes spent counseling Will try to cut  down    Assessment / Plan      Assessment/Plan:   Diagnoses and all orders for this visit:    1. Type 2 diabetes mellitus with hyperglycemia, with long-term current use of insulin (Primary)   We have to get his continuous glucose monitor working while he was in the clinic.  He understands how to monitor his sugars and will adjust his insulin based on the recordings to his meter.  If his meter does show that his sugars have been less than 200 he may just continue to record and keep his scheduled follow-up.  However, if his blood sugars remain above 200, he will schedule follow-up before his next scheduled appointment and we will further modify.    2. Primary hypertension   Patient's blood pressure is under control.  He is tolerating his medications at present time.  No adjustments are necessary currently.  If he does continue to have high systolic blood pressure we will increase his lisinopril from 10 to 20 mg daily.    3. Mixed hyperlipidemia   Patient will continue with his Lipitor.  He is tolerating at present time.  We will make no adjustments currently and will continue to adjust to keep his LDL less than 70.    4. Cigarette nicotine dependence in remission    5. Chronic left shoulder pain  Patient does have chronic left shoulder pain with what appears to be a frozen shoulder.  He has been seen by the orthopedist.  We will apply Voltaren gel to his regiment.  Patient states the pain is improved since he has been monitoring his diet and avoiding carbohydrates.  We will provide Voltaren gel as necessary.  If he has other problems we will pursue and treat accordingly.       Diclofenac Sodium (VOLTAREN) 1 % gel gel; Apply 4 g topically to the appropriate area as directed 4 (Four) Times a Day.  Dispense: 350 g; Refill: 11    6. Ganglion cyst of left foot  Patient does have an enlarging ganglion cyst on his left foot that is causing pain.  He is already seeing an orthopedist.  We have encouraged him to follow-up with  possible surgical removal.  -     Ambulatory Referral to Orthopedic Surgery        Follow Up:   Return in about 2 months (around 6/5/2024).      At [facility], we believe that sharing information builds trust and better relationships. You are receiving this note because you recently visited [facility]. It is possible you will see health information before a provider has talked with you about it. This kind of information can be easy to misunderstand. To help you fully understand what it means for your health, we urge you to discuss this note with your provider.    Steven Rivas MD  [facility]

## 2024-04-02 RX ORDER — ATORVASTATIN CALCIUM 80 MG/1
80 TABLET, FILM COATED ORAL DAILY
Qty: 90 TABLET | Refills: 0 | Status: SHIPPED | OUTPATIENT
Start: 2024-04-02

## 2024-04-30 DIAGNOSIS — I10 PRIMARY HYPERTENSION: ICD-10-CM

## 2024-05-01 RX ORDER — LISINOPRIL 10 MG/1
10 TABLET ORAL DAILY
Qty: 90 TABLET | Refills: 3 | Status: SHIPPED | OUTPATIENT
Start: 2024-05-01

## 2024-05-02 DIAGNOSIS — F90.9 ADULT ADHD: ICD-10-CM

## 2024-05-03 RX ORDER — DEXTROAMPHETAMINE SACCHARATE, AMPHETAMINE ASPARTATE, DEXTROAMPHETAMINE SULFATE AND AMPHETAMINE SULFATE 7.5; 7.5; 7.5; 7.5 MG/1; MG/1; MG/1; MG/1
TABLET ORAL
Qty: 30 TABLET | Refills: 0 | Status: SHIPPED | OUTPATIENT
Start: 2024-05-03

## 2024-06-03 DIAGNOSIS — F90.9 ADULT ADHD: ICD-10-CM

## 2024-06-03 RX ORDER — DEXTROAMPHETAMINE SACCHARATE, AMPHETAMINE ASPARTATE, DEXTROAMPHETAMINE SULFATE AND AMPHETAMINE SULFATE 7.5; 7.5; 7.5; 7.5 MG/1; MG/1; MG/1; MG/1
15 TABLET ORAL 2 TIMES DAILY
Qty: 30 TABLET | Refills: 0 | Status: SHIPPED | OUTPATIENT
Start: 2024-06-03

## 2024-06-03 NOTE — TELEPHONE ENCOUNTER
Caller: Colten Ogden    Relationship: Self    Best call back number: [phone]    Requested Prescriptions:   Requested Prescriptions     Pending Prescriptions Disp Refills    amphetamine-dextroamphetamine (ADDERALL) 30 MG tablet 30 tablet 0        [address] [facility] - [phone]  - [phone] FX     Last office visit with prescribing clinician: 4/1/2024   Last telemedicine visit with prescribing clinician: Visit date not found   Next office visit with prescribing clinician: 6/5/2024     Additional details provided by patient: PATIENT IS OUT OF MEDICATION    Does the patient have less than a 3 day supply:  [x] Yes  [] No    Would you like a call back once the refill request has been completed: [] Yes [x] No    If the office needs to give you a call back, can they leave a voicemail: [] Yes [x] No    Sammy Galeana Rep   06/03/24 15:15 EDT

## 2024-06-05 ENCOUNTER — OFFICE VISIT (OUTPATIENT)
Dept: FAMILY MEDICINE CLINIC | Facility: CLINIC | Age: 39
End: 2024-06-05
Payer: COMMERCIAL

## 2024-06-05 VITALS
SYSTOLIC BLOOD PRESSURE: 122 MMHG | WEIGHT: 185 LBS | BODY MASS INDEX: 24.52 KG/M2 | DIASTOLIC BLOOD PRESSURE: 90 MMHG | TEMPERATURE: 98.7 F | OXYGEN SATURATION: 97 % | HEIGHT: 73 IN | HEART RATE: 120 BPM

## 2024-06-05 DIAGNOSIS — I10 PRIMARY HYPERTENSION: ICD-10-CM

## 2024-06-05 DIAGNOSIS — Z79.4 TYPE 2 DIABETES MELLITUS WITH HYPERGLYCEMIA, WITH LONG-TERM CURRENT USE OF INSULIN: Primary | ICD-10-CM

## 2024-06-05 DIAGNOSIS — F17.211 CIGARETTE NICOTINE DEPENDENCE IN REMISSION: ICD-10-CM

## 2024-06-05 DIAGNOSIS — F90.9 ADULT ADHD: ICD-10-CM

## 2024-06-05 DIAGNOSIS — E78.2 MIXED HYPERLIPIDEMIA: ICD-10-CM

## 2024-06-05 DIAGNOSIS — E11.65 TYPE 2 DIABETES MELLITUS WITH HYPERGLYCEMIA, WITH LONG-TERM CURRENT USE OF INSULIN: Primary | ICD-10-CM

## 2024-06-05 PROCEDURE — 1159F MED LIST DOCD IN RCRD: CPT | Performed by: FAMILY MEDICINE

## 2024-06-05 PROCEDURE — 3046F HEMOGLOBIN A1C LEVEL >9.0%: CPT | Performed by: FAMILY MEDICINE

## 2024-06-05 PROCEDURE — 1160F RVW MEDS BY RX/DR IN RCRD: CPT | Performed by: FAMILY MEDICINE

## 2024-06-05 PROCEDURE — 3080F DIAST BP >= 90 MM HG: CPT | Performed by: FAMILY MEDICINE

## 2024-06-05 PROCEDURE — 3074F SYST BP LT 130 MM HG: CPT | Performed by: FAMILY MEDICINE

## 2024-06-05 PROCEDURE — 99214 OFFICE O/P EST MOD 30 MIN: CPT | Performed by: FAMILY MEDICINE

## 2024-06-05 PROCEDURE — 1125F AMNT PAIN NOTED PAIN PRSNT: CPT | Performed by: FAMILY MEDICINE

## 2024-06-05 NOTE — PROGRESS NOTES
Follow Up Office Visit      Date: 2024   Patient Name: Colten Ogden  : 1985   MRN: [id number]     Chief Complaint:    Chief Complaint   Patient presents with    Follow-up     3 month f/u       History of Present Illness: Colten Ogden is a 38 y.o. male who is here today for follow-up.  Patient been doing relatively well since last being seen.  He states that he has been checking his sugars on a intermittent basis and has not occasionally his sugars will be above 300.  Patient does not always give himself the insulin but most days he has been doing so.  He has noted that his blood sugars in the morning have improved but are not consistently less than 120.  He does however admit that most of the sugars in the morning before he eats is 180 or less.  He does continue to take his other medication as prescribed.  He does believe the Adderall is beneficial.  He denies any side effects of medications currently.  Patient has continue with his usual activity, appetite and sleep.  Patient denies any other cardiovascular, respiratory, gastrointestinal, urologic or neurologic complaints.    Subjective      Review of Systems:   Review of Systems   Constitutional:  Negative for activity change, appetite change and fatigue.   Respiratory:  Negative for cough, chest tightness, shortness of breath and wheezing.    Cardiovascular:  Negative for chest pain, palpitations and leg swelling.   Gastrointestinal:  Negative for abdominal distention, abdominal pain, blood in stool, constipation, diarrhea, nausea, vomiting and GERD.   Genitourinary:  Negative for dysuria, flank pain, frequency and urgency.   Musculoskeletal:  Negative for arthralgias and myalgias.   Neurological:  Negative for dizziness, tremors, seizures, weakness, numbness and memory problem.   Psychiatric/Behavioral:  Negative for sleep disturbance. The patient is not nervous/anxious.        I have reviewed the patients family history, social history,  past medical history, past surgical history and have updated it as appropriate.     Medications:     Current Outpatient Medications:     amphetamine-dextroamphetamine (ADDERALL) 30 MG tablet, Take 0.5 tablets by mouth 2 (Two) Times a Day., Disp: 30 tablet, Rfl: 0    atorvastatin (LIPITOR) 80 MG tablet, TAKE ONE TABLET BY MOUTH EVERY DAY FOR CHOLESTEROL, Disp: 90 tablet, Rfl: 0    Blood Glucose Monitoring Suppl kit, 1 each 3 (Three) Times a Day., Disp: 1 each, Rfl: 0    Continuous Blood Gluc Sensor (FreeStyle Noemy 2 Sensor) misc, Use 1 each Every 14 (Fourteen) Days., Disp: 2 each, Rfl: 11    Continuous Glucose  (FreeStyle Noemy 2 [place]) device, Use 1 each Daily., Disp: 1 each, Rfl: 11    Diclofenac Sodium (VOLTAREN) 1 % gel gel, Apply 4 g topically to the appropriate area as directed 4 (Four) Times a Day., Disp: 350 g, Rfl: 11    ezetimibe (Zetia) 10 MG tablet, Take 1 tablet by mouth Daily., Disp: 90 tablet, Rfl: 3    Glucose Blood (Blood Glucose Test) strip, 1 each by In Vitro route 3 (Three) Times a Day., Disp: 100 each, Rfl: 11    Insulin Glargine (BASAGLAR KWIKPEN) 100 UNIT/ML injection pen, Inject 32 Units under the skin into the appropriate area as directed Every Night., Disp: 15 mL, Rfl: 3    Insulin Pen Needle (Pen Needles) 32G X 4 MM misc, Use 1 each Daily., Disp: 100 each, Rfl: 3    Jardiance 25 MG tablet tablet, Take 1 tablet by mouth Daily., Disp: 90 tablet, Rfl: 3    Lancets (OneTouch Delica Plus [id number]) misc, 1 each by Other route Daily., Disp: , Rfl:     lisinopril (PRINIVIL,ZESTRIL) 10 MG tablet, TAKE 1 TABLET BY MOUTH DAILY., Disp: 90 tablet, Rfl: 3    metFORMIN (GLUCOPHAGE) 1000 MG tablet, TAKE ONE TABLET BY MOUTH TWO TIMES A DAY WITH MEALS., Disp: 180 tablet, Rfl: 0    pioglitazone (ACTOS) 45 MG tablet, TAKE 1 TABLET BY MOUTH DAILY., Disp: 90 tablet, Rfl: 3    Allergies:   No Known Allergies    Immunizations:   Immunization History   Administered Date(s) Administered    COVID-19  "(MODERNA) 1st,2nd,3rd Dose Monovalent 03/24/2021, 04/21/2021    COVID-19 (UNSPECIFIED) 03/24/2021, 04/21/2021    Fluzone (or Fluarix & Flulaval for VFC) >6mos 09/14/2018, 11/02/2022    Hep B, Adolescent or Pediatric 02/13/2002, 03/18/2002, 08/13/2002    Influenza Seasonal Injectable 09/25/2009    Influenza, Unspecified 09/25/2009, 11/02/2022    Pneumococcal Conjugate 20-Valent (PCV20) 02/06/2023    Td (TDVAX) 08/13/2002    Tdap 02/06/2023        Objective     Physical Exam: Please see above  Vital Signs:   Vitals:    06/05/24 1440   BP: 122/90   Pulse: 120   Temp: 98.7 °F (37.1 °C)   SpO2: 97%   Weight: 83.9 kg (185 lb)   Height: 185.4 cm (73\")     Body mass index is 24.41 kg/m².  BMI is within normal parameters. No other follow-up for BMI required.       Physical Exam  Vitals and nursing note reviewed.   Constitutional:       Appearance: Normal appearance.   HENT:      Head: Normocephalic and atraumatic.      Nose: Nose normal.      Mouth/Throat:      Pharynx: Oropharynx is clear.   Eyes:      Extraocular Movements: Extraocular movements intact.      Pupils: Pupils are equal, round, and reactive to light.   Neck:      Thyroid: No thyroid mass or thyromegaly.      Trachea: Trachea normal.   Cardiovascular:      Rate and Rhythm: Normal rate and regular rhythm.      Pulses: Normal pulses. No decreased pulses.      Heart sounds: Normal heart sounds.   Pulmonary:      Effort: Pulmonary effort is normal.      Breath sounds: Normal breath sounds.   Abdominal:      General: Abdomen is flat. Bowel sounds are normal.      Palpations: Abdomen is soft.      Tenderness: There is no abdominal tenderness.   Musculoskeletal:      Cervical back: Neck supple.      Right lower leg: No edema.      Left lower leg: No edema.   Lymphadenopathy:      Cervical: No cervical adenopathy.   Skin:     General: Skin is warm and dry.   Neurological:      General: No focal deficit present.      Mental Status: He is alert and oriented to person, " place, and time.      Sensory: Sensation is intact.      Motor: Motor function is intact.      Coordination: Coordination is intact.   Psychiatric:         Attention and Perception: Attention normal.         Mood and Affect: Mood normal.         Speech: Speech normal.         Behavior: Behavior normal.         Procedures    Results:   Labs:   Hemoglobin A1C   Date Value Ref Range Status   03/05/2024 11.70 (H) 4.80 - 5.60 % Final     TSH   Date Value Ref Range Status   11/02/2022 1.730 0.270 - 4.200 uIU/mL Final        POCT Results (if applicable):   Results for orders placed or performed in visit on 03/05/24   Comprehensive Metabolic Panel    Specimen: Arm, Right; Blood   Result Value Ref Range    Glucose 228 (H) 65 - 99 mg/dL    BUN 14 6 - 20 mg/dL    Creatinine 0.89 0.76 - 1.27 mg/dL    Sodium 139 136 - 145 mmol/L    Potassium 4.3 3.5 - 5.2 mmol/L    Chloride 104 98 - 107 mmol/L    CO2 25.5 22.0 - 29.0 mmol/L    Calcium 9.5 8.6 - 10.5 mg/dL    Total Protein 6.8 6.0 - 8.5 g/dL    Albumin 4.4 3.5 - 5.2 g/dL    ALT (SGPT) 16 1 - 41 U/L    AST (SGOT) 10 1 - 40 U/L    Alkaline Phosphatase 77 39 - 117 U/L    Total Bilirubin 0.4 0.0 - 1.2 mg/dL    Globulin 2.4 gm/dL    A/G Ratio 1.8 g/dL    BUN/Creatinine Ratio 15.7 7.0 - 25.0    Anion Gap 9.5 5.0 - 15.0 mmol/L    eGFR 113.2 >60.0 mL/min/1.73   Lipid Panel    Specimen: Arm, Right; Blood   Result Value Ref Range    Total Cholesterol 176 0 - 200 mg/dL    Triglycerides 69 0 - 150 mg/dL    HDL Cholesterol 53 40 - 60 mg/dL    LDL Cholesterol  110 (H) 0 - 100 mg/dL    VLDL Cholesterol 13 5 - 40 mg/dL    LDL/HDL Ratio 2.06    Hemoglobin A1c    Specimen: Arm, Right; Blood   Result Value Ref Range    Hemoglobin A1C 11.70 (H) 4.80 - 5.60 %       Imaging:   No valid procedures specified.     Measures:   Advanced Care Planning:   Patient does not have an advance directive, information provided.    Smoking Cessation:   less than 3 minutes spent counseling Has reduced [place]  use    Assessment / Plan      Assessment/Plan:   Diagnoses and all orders for this visit:    1. Type 2 diabetes mellitus with hyperglycemia, with long-term current use of insulin (Primary)  Patient has not been using his insulin and as well as hoped.  However, he is using the insulin more than he has in the past.  We we will write another prescription for the freestyle noemy 2 reader as apparently his device has been lost.  We will continue to monitor his blood sugars and will obtain hemoglobin A1c.  We did discuss that since his postprandial sugars are elevated that he will need to add additional insulin for each meal as he cannot avoid eating carbohydrates.  We will continue to monitor and will again have offered dietetics which he declines at present time.  -     Hemoglobin A1c; Future  -     Continuous Glucose  (FreeStyle Noemy 2 [place]) device; Use 1 each Daily.  Dispense: 1 each; Refill: 11    2. Primary hypertension  Patient's blood pressure is doing well at present time.  He is tolerating his medications.  We will obtain renal profile and will make adjustments based on these findings.-     Comprehensive Metabolic Panel; Future  -     BEARDEN Fibrosure; Future    3. Mixed hyperlipidemia  Patient is taking his lipid lowering agent as prescribed.  We will continue with current regimen and will monitor.  We will obtain lipid profile, liver function test as well as Bearden FibroSure will make adjustments based on these findings.  Our goal was for his LDL to be less than 70.  -     Lipid Panel; Future  -     BEARDEN Fibrosure; Future    4. Cigarette nicotine dependence in remission   Patient does continue to use vaping.  We will continue to monitor his symptoms and have encouraged him to discontinue.  He states some days he does not use.  I have suggested that he bring in the electronic cigarette here to the office and we will dispose of it for him.    5. Adult ADHD   No difficulties with respect to his adult ADD  with use of the medications.  His current [place] and previous urine drug screens did not reveal any abnormality.  He is tolerating the medications without complaints.  Will continue with his current regimen and will monitor and will treat accordingly.      Follow Up:   Return in about 3 months (around 9/5/2024).      At [facility], we believe that sharing information builds trust and better relationships. You are receiving this note because you recently visited [facility]. It is possible you will see health information before a provider has talked with you about it. This kind of information can be easy to misunderstand. To help you fully understand what it means for your health, we urge you to discuss this note with your provider.    Steven Rivas MD  [facility]

## 2024-06-27 RX ORDER — ATORVASTATIN CALCIUM 80 MG/1
80 TABLET, FILM COATED ORAL DAILY
Qty: 30 TABLET | Refills: 12 | Status: SHIPPED | OUTPATIENT
Start: 2024-06-27

## 2024-07-01 DIAGNOSIS — F90.9 ADULT ADHD: ICD-10-CM

## 2024-07-01 RX ORDER — DEXTROAMPHETAMINE SACCHARATE, AMPHETAMINE ASPARTATE, DEXTROAMPHETAMINE SULFATE AND AMPHETAMINE SULFATE 7.5; 7.5; 7.5; 7.5 MG/1; MG/1; MG/1; MG/1
15 TABLET ORAL 2 TIMES DAILY
Qty: 30 TABLET | Refills: 0 | Status: SHIPPED | OUTPATIENT
Start: 2024-07-01

## 2024-07-01 NOTE — TELEPHONE ENCOUNTER
Caller: Colten Ogden    Relationship: Self    Best call back number: [phone]     Requested Prescriptions:   Requested Prescriptions     Pending Prescriptions Disp Refills    amphetamine-dextroamphetamine (ADDERALL) 30 MG tablet 30 tablet 0     Sig: Take 0.5 tablets by mouth 2 (Two) Times a Day.        [address] [facility] - [phone]  - [phone] [facility]     Last office visit with prescribing clinician: 6/5/2024   Last telemedicine visit with prescribing clinician: Visit date not found   Next office visit with prescribing clinician: 9/5/2024     Additional details provided by patient: PATIENT HAS 2 DAYS REMAINING PLEASE ADVISE     Does the patient have less than a 3 day supply:  [x] Yes  [] No    Would you like a call back once the refill request has been completed: [x] Yes [] No    If the office needs to give you a call back, can they leave a voicemail: [x] Yes [] No    Sammy Salas Rep   07/01/24 09:17 EDT

## 2024-07-30 DIAGNOSIS — F90.9 ADULT ADHD: ICD-10-CM

## 2024-07-30 RX ORDER — DEXTROAMPHETAMINE SACCHARATE, AMPHETAMINE ASPARTATE, DEXTROAMPHETAMINE SULFATE AND AMPHETAMINE SULFATE 7.5; 7.5; 7.5; 7.5 MG/1; MG/1; MG/1; MG/1
TABLET ORAL
Qty: 30 TABLET | Refills: 0 | Status: SHIPPED | OUTPATIENT
Start: 2024-07-30

## 2024-07-30 NOTE — TELEPHONE ENCOUNTER
Caller: ELIEZER TIPTON    Relationship: Mother    Best call back number: [phone]    Requested Prescriptions:   Requested Prescriptions     Pending Prescriptions Disp Refills    amphetamine-dextroamphetamine (ADDERALL) 30 MG tablet [Pharmacy Med Name: AMPHETAMINE SALTS 30 MG TAB 30 Tablet] 30 tablet 0     Sig: TAKE 1/2 TABLET TWO TIMES A DAY        [address] be [address] [facility] - [phone] [facility] [phone]      Last office visit with prescribing clinician: 6/5/2024   Last telemedicine visit with prescribing clinician: Visit date not found   Next office visit with prescribing clinician: 9/5/2024     Additional details provided by patient: PATIENT WILL BE OUT OF MEDICATION     Does the patient have less than a 3 day supply:  [x] Yes  [] No    Would you like a call back once the refill request has been completed: [] Yes [x] No    If the office needs to give you a call back, can they leave a voicemail: [] Yes [x] No    Sammy Galeana Rep   07/30/24 11:48 EDT

## 2024-08-24 DIAGNOSIS — F90.9 ADULT ADHD: ICD-10-CM

## 2024-08-26 RX ORDER — DEXTROAMPHETAMINE SACCHARATE, AMPHETAMINE ASPARTATE, DEXTROAMPHETAMINE SULFATE AND AMPHETAMINE SULFATE 7.5; 7.5; 7.5; 7.5 MG/1; MG/1; MG/1; MG/1
0.5 TABLET ORAL 2 TIMES DAILY
Qty: 30 TABLET | Refills: 0 | Status: SHIPPED | OUTPATIENT
Start: 2024-08-26

## 2024-09-05 ENCOUNTER — LAB (OUTPATIENT)
Dept: FAMILY MEDICINE CLINIC | Facility: CLINIC | Age: 39
End: 2024-09-05
Payer: COMMERCIAL

## 2024-09-05 ENCOUNTER — OFFICE VISIT (OUTPATIENT)
Dept: FAMILY MEDICINE CLINIC | Facility: CLINIC | Age: 39
End: 2024-09-05
Payer: COMMERCIAL

## 2024-09-05 VITALS
RESPIRATION RATE: 18 BRPM | TEMPERATURE: 98.4 F | OXYGEN SATURATION: 98 % | HEIGHT: 73 IN | WEIGHT: 187.8 LBS | BODY MASS INDEX: 24.89 KG/M2 | HEART RATE: 101 BPM | DIASTOLIC BLOOD PRESSURE: 88 MMHG | SYSTOLIC BLOOD PRESSURE: 146 MMHG

## 2024-09-05 DIAGNOSIS — Z78.9 ELECTRONIC CIGARETTE USE: ICD-10-CM

## 2024-09-05 DIAGNOSIS — E11.65 TYPE 2 DIABETES MELLITUS WITH HYPERGLYCEMIA, WITH LONG-TERM CURRENT USE OF INSULIN: Primary | ICD-10-CM

## 2024-09-05 DIAGNOSIS — F90.9 ADULT ADHD: ICD-10-CM

## 2024-09-05 DIAGNOSIS — E11.65 TYPE 2 DIABETES MELLITUS WITH HYPERGLYCEMIA, WITH LONG-TERM CURRENT USE OF INSULIN: ICD-10-CM

## 2024-09-05 DIAGNOSIS — I10 PRIMARY HYPERTENSION: ICD-10-CM

## 2024-09-05 DIAGNOSIS — E78.2 MIXED HYPERLIPIDEMIA: ICD-10-CM

## 2024-09-05 DIAGNOSIS — Z79.4 TYPE 2 DIABETES MELLITUS WITH HYPERGLYCEMIA, WITH LONG-TERM CURRENT USE OF INSULIN: Primary | ICD-10-CM

## 2024-09-05 DIAGNOSIS — Z79.4 TYPE 2 DIABETES MELLITUS WITH HYPERGLYCEMIA, WITH LONG-TERM CURRENT USE OF INSULIN: ICD-10-CM

## 2024-09-05 DIAGNOSIS — F17.211 CIGARETTE NICOTINE DEPENDENCE IN REMISSION: ICD-10-CM

## 2024-09-05 LAB
ALBUMIN SERPL-MCNC: 4.9 G/DL (ref 3.5–5.2)
ALBUMIN UR-MCNC: <1.2 MG/DL
ALBUMIN/GLOB SERPL: 1.8 G/DL
ALP SERPL-CCNC: 78 U/L (ref 39–117)
ALT SERPL W P-5'-P-CCNC: 28 U/L (ref 1–41)
AMPHET+METHAMPHET UR QL: POSITIVE
AMPHETAMINES UR QL: POSITIVE
ANION GAP SERPL CALCULATED.3IONS-SCNC: 8.5 MMOL/L (ref 5–15)
AST SERPL-CCNC: 14 U/L (ref 1–40)
BARBITURATES UR QL SCN: NEGATIVE
BENZODIAZ UR QL SCN: NEGATIVE
BILIRUB SERPL-MCNC: 0.5 MG/DL (ref 0–1.2)
BILIRUB UR QL STRIP: NEGATIVE
BUN SERPL-MCNC: 19 MG/DL (ref 6–20)
BUN/CREAT SERPL: 18.3 (ref 7–25)
BUPRENORPHINE SERPL-MCNC: NEGATIVE NG/ML
CALCIUM SPEC-SCNC: 10.4 MG/DL (ref 8.6–10.5)
CANNABINOIDS SERPL QL: POSITIVE
CHLORIDE SERPL-SCNC: 104 MMOL/L (ref 98–107)
CHOLEST SERPL-MCNC: 109 MG/DL (ref 0–200)
CLARITY UR: CLEAR
CO2 SERPL-SCNC: 29.5 MMOL/L (ref 22–29)
COCAINE UR QL: NEGATIVE
COLOR UR: YELLOW
CREAT SERPL-MCNC: 1.04 MG/DL (ref 0.76–1.27)
EGFRCR SERPLBLD CKD-EPI 2021: 94.3 ML/MIN/1.73
FENTANYL UR-MCNC: NEGATIVE NG/ML
GLOBULIN UR ELPH-MCNC: 2.8 GM/DL
GLUCOSE SERPL-MCNC: 84 MG/DL (ref 65–99)
GLUCOSE UR STRIP-MCNC: NEGATIVE MG/DL
HBA1C MFR BLD: 11.2 % (ref 4.8–5.6)
HDLC SERPL-MCNC: 59 MG/DL (ref 40–60)
HGB UR QL STRIP.AUTO: NEGATIVE
KETONES UR QL STRIP: ABNORMAL
LDLC SERPL CALC-MCNC: 38 MG/DL (ref 0–100)
LDLC/HDLC SERPL: 0.68 {RATIO}
LEUKOCYTE ESTERASE UR QL STRIP.AUTO: NEGATIVE
METHADONE UR QL SCN: NEGATIVE
NITRITE UR QL STRIP: NEGATIVE
OPIATES UR QL: NEGATIVE
OXYCODONE UR QL SCN: NEGATIVE
PCP UR QL SCN: NEGATIVE
PH UR STRIP.AUTO: 6 [PH] (ref 5–8)
POTASSIUM SERPL-SCNC: 3.3 MMOL/L (ref 3.5–5.2)
PROT SERPL-MCNC: 7.7 G/DL (ref 6–8.5)
PROT UR QL STRIP: ABNORMAL
SODIUM SERPL-SCNC: 142 MMOL/L (ref 136–145)
SP GR UR STRIP: >1.03 (ref 1–1.03)
TRICYCLICS UR QL SCN: NEGATIVE
TRIGL SERPL-MCNC: 50 MG/DL (ref 0–150)
UROBILINOGEN UR QL STRIP: ABNORMAL
VLDLC SERPL-MCNC: 12 MG/DL (ref 5–40)

## 2024-09-05 PROCEDURE — 82043 UR ALBUMIN QUANTITATIVE: CPT | Performed by: FAMILY MEDICINE

## 2024-09-05 PROCEDURE — 83036 HEMOGLOBIN GLYCOSYLATED A1C: CPT | Performed by: FAMILY MEDICINE

## 2024-09-05 PROCEDURE — 82977 ASSAY OF GGT: CPT | Performed by: FAMILY MEDICINE

## 2024-09-05 PROCEDURE — 99214 OFFICE O/P EST MOD 30 MIN: CPT | Performed by: FAMILY MEDICINE

## 2024-09-05 PROCEDURE — 80061 LIPID PANEL: CPT | Performed by: FAMILY MEDICINE

## 2024-09-05 PROCEDURE — 84478 ASSAY OF TRIGLYCERIDES: CPT | Performed by: FAMILY MEDICINE

## 2024-09-05 PROCEDURE — 83010 ASSAY OF HAPTOGLOBIN QUANT: CPT | Performed by: FAMILY MEDICINE

## 2024-09-05 PROCEDURE — 36415 COLL VENOUS BLD VENIPUNCTURE: CPT | Performed by: FAMILY MEDICINE

## 2024-09-05 PROCEDURE — 1160F RVW MEDS BY RX/DR IN RCRD: CPT | Performed by: FAMILY MEDICINE

## 2024-09-05 PROCEDURE — 82465 ASSAY BLD/SERUM CHOLESTEROL: CPT | Performed by: FAMILY MEDICINE

## 2024-09-05 PROCEDURE — 81003 URINALYSIS AUTO W/O SCOPE: CPT | Performed by: FAMILY MEDICINE

## 2024-09-05 PROCEDURE — 1126F AMNT PAIN NOTED NONE PRSNT: CPT | Performed by: FAMILY MEDICINE

## 2024-09-05 PROCEDURE — 83883 ASSAY NEPHELOMETRY NOT SPEC: CPT | Performed by: FAMILY MEDICINE

## 2024-09-05 PROCEDURE — 80307 DRUG TEST PRSMV CHEM ANLYZR: CPT | Performed by: FAMILY MEDICINE

## 2024-09-05 PROCEDURE — 82172 ASSAY OF APOLIPOPROTEIN: CPT | Performed by: FAMILY MEDICINE

## 2024-09-05 PROCEDURE — 3077F SYST BP >= 140 MM HG: CPT | Performed by: FAMILY MEDICINE

## 2024-09-05 PROCEDURE — 1159F MED LIST DOCD IN RCRD: CPT | Performed by: FAMILY MEDICINE

## 2024-09-05 PROCEDURE — 3079F DIAST BP 80-89 MM HG: CPT | Performed by: FAMILY MEDICINE

## 2024-09-05 PROCEDURE — 80053 COMPREHEN METABOLIC PANEL: CPT | Performed by: FAMILY MEDICINE

## 2024-09-05 PROCEDURE — 3046F HEMOGLOBIN A1C LEVEL >9.0%: CPT | Performed by: FAMILY MEDICINE

## 2024-09-05 NOTE — PROGRESS NOTES
Follow Up Office Visit      Date: 2024   Patient Name: Colten Ogden  : 1985   MRN: [id number]     Chief Complaint:    Chief Complaint   Patient presents with    Follow-up       History of Present Illness: Colten Ogden is a 39 y.o. male who is here today for follow-up.  Patient states that he has been doing well since last being seen but never did get his laboratory data.  Patient is also not checking his blood sugars.  He does continue with his insulin but does not believe that he has had any episodes of hypoglycemia.  Patient has noted no other abnormality with respect to hypoglycemic episodes.  He is tolerating his medications and believe that the medication is beneficial.  He has not had any problems with side effects at present time.  Patient appears to be doing otherwise well.  They have continue with their medications without any side effects.  They have not had any changes in their usual activity, appetite and sleep.  Patient denies any other cardiovascular, respiratory, gastrointestinal, urologic or neurologic complaints.    Subjective      Review of Systems:   Review of Systems   Constitutional:  Negative for activity change, appetite change and fatigue.   Respiratory:  Negative for cough, chest tightness, shortness of breath and wheezing.    Cardiovascular:  Negative for chest pain, palpitations and leg swelling.   Gastrointestinal:  Negative for abdominal distention, abdominal pain, blood in stool, constipation, diarrhea, nausea, vomiting, GERD and indigestion.   Genitourinary:  Negative for difficulty urinating, dysuria, flank pain, frequency, hematuria and urgency.   Musculoskeletal:  Negative for arthralgias, back pain, gait problem, joint swelling and myalgias.   Neurological:  Negative for dizziness, tremors, seizures, syncope, weakness, light-headedness, numbness, headache and memory problem.   Psychiatric/Behavioral:  Negative for sleep disturbance and depressed mood. The  patient is not nervous/anxious.        I have reviewed the patients family history, social history, past medical history, past surgical history and have updated it as appropriate.     Medications:     Current Outpatient Medications:     amphetamine-dextroamphetamine (ADDERALL) 30 MG tablet, TAKE ONE-HALF TABLET BY MOUTH TWO TIMES A DAY, Disp: 30 tablet, Rfl: 0    atorvastatin (LIPITOR) 80 MG tablet, TAKE ONE TABLET BY MOUTH EVERY DAY FOR CHOLESTEROL, Disp: 30 tablet, Rfl: 12    Blood Glucose Monitoring Suppl kit, 1 each 3 (Three) Times a Day., Disp: 1 each, Rfl: 0    Continuous Blood Gluc Sensor (FreeStyle Noemy 2 Sensor) misc, Use 1 each Every 14 (Fourteen) Days., Disp: 2 each, Rfl: 11    Continuous Glucose  (FreeStyle Noemy 2 [place]) device, Use 1 each Daily., Disp: 1 each, Rfl: 11    Diclofenac Sodium (VOLTAREN) 1 % gel gel, Apply 4 g topically to the appropriate area as directed 4 (Four) Times a Day., Disp: 350 g, Rfl: 11    ezetimibe (Zetia) 10 MG tablet, Take 1 tablet by mouth Daily., Disp: 90 tablet, Rfl: 3    Glucose Blood (Blood Glucose Test) strip, 1 each by In Vitro route 3 (Three) Times a Day., Disp: 100 each, Rfl: 11    Insulin Glargine (BASAGLAR KWIKPEN) 100 UNIT/ML injection pen, Inject 32 Units under the skin into the appropriate area as directed Every Night., Disp: 15 mL, Rfl: 3    Insulin Pen Needle (Pen Needles) 32G X 4 MM misc, Use 1 each Daily., Disp: 100 each, Rfl: 3    Jardiance 25 MG tablet tablet, Take 1 tablet by mouth Daily., Disp: 90 tablet, Rfl: 3    Lancets (OneTouch Delica Plus [id number]) misc, 1 each by Other route Daily., Disp: , Rfl:     lisinopril (PRINIVIL,ZESTRIL) 10 MG tablet, TAKE 1 TABLET BY MOUTH DAILY., Disp: 90 tablet, Rfl: 3    metFORMIN (GLUCOPHAGE) 1000 MG tablet, TAKE ONE TABLET BY MOUTH TWO TIMES A DAY WITH MEALS., Disp: 60 tablet, Rfl: 12    pioglitazone (ACTOS) 45 MG tablet, TAKE 1 TABLET BY MOUTH DAILY., Disp: 90 tablet, Rfl: 3    Allergies:   No Known  "Allergies    Immunizations:   Immunization History   Administered Date(s) Administered    COVID-19 (MODERNA) 1st,2nd,3rd Dose Monovalent 03/24/2021, 04/21/2021    COVID-19 (UNSPECIFIED) 03/24/2021, 04/21/2021    Fluzone (or Fluarix & Flulaval for VFC) >6mos 09/14/2018, 11/02/2022    Hep B, Adolescent or Pediatric 02/13/2002, 03/18/2002, 08/13/2002    Influenza Seasonal Injectable 09/25/2009    Influenza, Unspecified 09/25/2009, 11/02/2022    Pneumococcal Conjugate 20-Valent (PCV20) 02/06/2023    Td (TDVAX) 08/13/2002    Tdap 02/06/2023        Objective     Physical Exam: Please see above  Vital Signs:   Vitals:    09/05/24 1506   BP: 146/88   BP Location: Left arm   Patient Position: Sitting   Cuff Size: Adult   Pulse: 101   Resp: 18   Temp: 98.4 °F (36.9 °C)   TempSrc: Temporal   SpO2: 98%   Weight: 85.2 kg (187 lb 12.8 oz)   Height: 185.4 cm (72.99\")   PainSc: 0-No pain     Body mass index is 24.78 kg/m².  BMI is within normal parameters. No other follow-up for BMI required.       Physical Exam  Vitals and nursing note reviewed.   Constitutional:       Appearance: Normal appearance.   HENT:      Head: Normocephalic and atraumatic.      Nose: Nose normal.      Mouth/Throat:      Pharynx: Oropharynx is clear.   Eyes:      Extraocular Movements: Extraocular movements intact.      Pupils: Pupils are equal, round, and reactive to light.   Neck:      Thyroid: No thyroid mass or thyromegaly.      Trachea: Trachea normal.   Cardiovascular:      Rate and Rhythm: Normal rate and regular rhythm.      Pulses: Normal pulses. No decreased pulses.      Heart sounds: Normal heart sounds.   Pulmonary:      Effort: Pulmonary effort is normal.      Breath sounds: Normal breath sounds.   Abdominal:      General: Abdomen is flat. Bowel sounds are normal.      Palpations: Abdomen is soft.      Tenderness: There is no abdominal tenderness.   Musculoskeletal:      Cervical back: Neck supple.      Right lower leg: No edema.      Left " lower leg: No edema.   Lymphadenopathy:      Cervical: No cervical adenopathy.   Skin:     General: Skin is warm and dry.   Neurological:      General: No focal deficit present.      Mental Status: He is alert and oriented to person, place, and time.      Sensory: Sensation is intact.      Motor: Motor function is intact.      Coordination: Coordination is intact.   Psychiatric:         Attention and Perception: Attention normal.         Mood and Affect: Mood normal.         Speech: Speech normal.         Behavior: Behavior normal.         Procedures    Results:   Labs:   Hemoglobin A1C   Date Value Ref Range Status   09/05/2024 11.20 (H) 4.80 - 5.60 % Final     TSH   Date Value Ref Range Status   11/02/2022 1.730 0.270 - 4.200 uIU/mL Final        POCT Results (if applicable):   Results for orders placed or performed in visit on 03/05/24   Comprehensive Metabolic Panel    Specimen: Arm, Right; Blood   Result Value Ref Range    Glucose 228 (H) 65 - 99 mg/dL    BUN 14 6 - 20 mg/dL    Creatinine 0.89 0.76 - 1.27 mg/dL    Sodium 139 136 - 145 mmol/L    Potassium 4.3 3.5 - 5.2 mmol/L    Chloride 104 98 - 107 mmol/L    CO2 25.5 22.0 - 29.0 mmol/L    Calcium 9.5 8.6 - 10.5 mg/dL    Total Protein 6.8 6.0 - 8.5 g/dL    Albumin 4.4 3.5 - 5.2 g/dL    ALT (SGPT) 16 1 - 41 U/L    AST (SGOT) 10 1 - 40 U/L    Alkaline Phosphatase 77 39 - 117 U/L    Total Bilirubin 0.4 0.0 - 1.2 mg/dL    Globulin 2.4 gm/dL    A/G Ratio 1.8 g/dL    BUN/Creatinine Ratio 15.7 7.0 - 25.0    Anion Gap 9.5 5.0 - 15.0 mmol/L    eGFR 113.2 >60.0 mL/min/1.73   Lipid Panel    Specimen: Arm, Right; Blood   Result Value Ref Range    Total Cholesterol 176 0 - 200 mg/dL    Triglycerides 69 0 - 150 mg/dL    HDL Cholesterol 53 40 - 60 mg/dL    LDL Cholesterol  110 (H) 0 - 100 mg/dL    VLDL Cholesterol 13 5 - 40 mg/dL    LDL/HDL Ratio 2.06    Hemoglobin A1c    Specimen: Arm, Right; Blood   Result Value Ref Range    Hemoglobin A1C 11.70 (H) 4.80 - 5.60 %        Imaging:   No valid procedures specified.     Measures:   Advanced Care Planning:   Patient does not have an advance directive, information provided.    Smoking Cessation:   Non-smoker.    Assessment / Plan      Assessment/Plan:   Diagnoses and all orders for this visit:    1. Type 2 diabetes mellitus with hyperglycemia, with long-term current use of insulin (Primary)  Patient does appear to be doing relatively well with respect to their diabetes.  They are tolerating their medications without complaints.  We will continue to follow their hemoglobin A1c and will make adjustments to keep their level less than 7%.  Unfortunately we have been unable to assess his blood sugars as he is no longer checking his blood sugars despite our encouragement.  He also continues with dietary indiscretions.  We will obtain hemoglobin A1c and see where we stand and will make adjustments based on these findings.  Hopefully his hemoglobin A1c is under better control than what was previous.  Patient does understand the importance of treating his diabetes to prevent future complications.  Referral to and endocrinologist may be necessary.  -     MicroAlbumin, Urine, Random - Urine, Clean Catch; Future    2. Primary hypertension  Patient appears to be tolerating their blood pressure medicine without any side effects.  We will continue to monitor their blood pressure and will adjust to keep there is systolic blood pressure less than 130.  We will continue to monitor renal function and will make adjustments based on these findings.  -     Urinalysis without microscopic (no culture) - Urine, Clean Catch; Future    3. Mixed hyperlipidemia   Patient does appear to be tolerating their lipid-lowering agent without difficulty.  We will obtain the lipid profile as well as liver function test to observe for any abnormalities.  We will continue to adjust medications to keep their LDL less than 70.    4. Adult ADHD  Patient is doing very well  with respect to their ADHD.  They are tolerating their medications well without any side effects.  They have not had any complaints of insomnia, agitation, anxiety, tachycardia excetra.  They understands the risk and benefits of medications and believe that things are doing well and does not wish to make any changes currently.  We will follow-up in 3 months time or sooner if there is any other questions or concerns.  -     Urine Drug Screen - Urine, Clean Catch; Future    5. Cigarette nicotine dependence in remission   Patient no longer smokes but does use electronic cigarettes.  We have encouraged him to discontinue the use of the electronic cigarette as well.  We will provide medical assistance if necessary.    6. Electronic cigarette use        Follow Up:   Return in about 3 months (around 12/5/2024).      At [facility], we believe that sharing information builds trust and better relationships. You are receiving this note because you recently visited [facility]. It is possible you will see health information before a provider has talked with you about it. This kind of information can be easy to misunderstand. To help you fully understand what it means for your health, we urge you to discuss this note with your provider.    Steven Rivas MD  [facility]

## 2024-09-06 NOTE — PROGRESS NOTES
Called patient and let him know he needed to schedule an appointment to review lab work with the Dr. He scheduled his appointment for Oct 7

## 2024-09-09 LAB
A2 MACROGLOB SERPL-MCNC: 187 MG/DL (ref 110–276)
ALT SERPL W P-5'-P-CCNC: 32 IU/L (ref 0–55)
APO A-I SERPL-MCNC: 149 MG/DL (ref 101–178)
AST SERPL W P-5'-P-CCNC: 18 IU/L (ref 0–40)
BILIRUB SERPL-MCNC: 0.4 MG/DL (ref 0–1.2)
CHOLEST SERPL-MCNC: 112 MG/DL (ref 100–199)
FIBROSIS SCORING:: NORMAL
FIBROSIS STAGE SERPL QL: NORMAL
GGT SERPL-CCNC: 43 IU/L (ref 0–65)
GLUCOSE SERPL-MCNC: 85 MG/DL (ref 70–99)
HAPTOGLOB SERPL-MCNC: 175 MG/DL (ref 17–317)
LABORATORY COMMENT REPORT: NORMAL
LIVER FIBR SCORE SERPL CALC.FIBROSURE: 0.13 (ref 0–0.21)
LIVER STEATOSIS GRADE SERPL QL: NORMAL
LIVER STEATOSIS SCORE SERPL: 0.26 (ref 0–0.4)
NASH GRADE SERPL QL: NORMAL
NASH INTERPRETATION SERPL-IMP: NORMAL
NASH SCORE SERPL: 0 (ref 0–0.25)
NASH SCORING: NORMAL
STEATOSIS SCORING: NORMAL
TEST PERFORMANCE INFO SPEC: NORMAL
TEST PERFORMANCE INFO SPEC: NORMAL
TRIGL SERPL-MCNC: 80 MG/DL (ref 0–149)

## 2024-09-10 ENCOUNTER — TELEPHONE (OUTPATIENT)
Dept: FAMILY MEDICINE CLINIC | Facility: CLINIC | Age: 39
End: 2024-09-10
Payer: COMMERCIAL

## 2024-09-10 NOTE — TELEPHONE ENCOUNTER
----- Message from Steven Rivas sent at 9/10/2024  6:08 AM EDT -----  Patient's FibroSure test suggest against skins fatty liver or fibrosis.  However, he needs to focus on treating his diabetes aggressively.  He needs to partake of a low carbohydrate/low-fat diet as well as increase his aerobic exercise to 300 minutes weekly.  We will discuss further during his scheduled follow-up.

## 2024-09-12 NOTE — TELEPHONE ENCOUNTER
Contacted patient to speak about results. No answer, voicemail left asking patient to return call to speak about results.   Letter sent with results.

## 2024-09-27 DIAGNOSIS — F90.9 ADULT ADHD: ICD-10-CM

## 2024-09-29 RX ORDER — DEXTROAMPHETAMINE SACCHARATE, AMPHETAMINE ASPARTATE, DEXTROAMPHETAMINE SULFATE AND AMPHETAMINE SULFATE 7.5; 7.5; 7.5; 7.5 MG/1; MG/1; MG/1; MG/1
0.5 TABLET ORAL 2 TIMES DAILY
Qty: 30 TABLET | Refills: 0 | Status: SHIPPED | OUTPATIENT
Start: 2024-09-29

## 2024-09-30 RX ORDER — DEXTROAMPHETAMINE SACCHARATE, AMPHETAMINE ASPARTATE, DEXTROAMPHETAMINE SULFATE AND AMPHETAMINE SULFATE 7.5; 7.5; 7.5; 7.5 MG/1; MG/1; MG/1; MG/1
0.5 TABLET ORAL 2 TIMES DAILY
Qty: 30 TABLET | Refills: 0 | OUTPATIENT
Start: 2024-09-30

## 2024-09-30 NOTE — TELEPHONE ENCOUNTER
Caller: ELIEZER TIPTON    Relationship: Mother    Best call back number:      Requested Prescriptions:   Requested Prescriptions     Pending Prescriptions Disp Refills    amphetamine-dextroamphetamine (ADDERALL) 30 MG tablet [Pharmacy Med Name: AMPHETAMINE SALTS 30 MG TAB 30 Tablet] 30 tablet 0     Sig: TAKE ONE-HALF TABLET BY MOUTH TWO TIMES A DAY        [address] [facility] - [phone]  - [phone] FX     Last office visit with prescribing clinician: 9/5/2024   Last telemedicine visit with prescribing clinician: Visit date not found   Next office visit with prescribing clinician: 10/7/2024     Additional details provided by patient: PATIENT IS OUT OF MEDICATOIN    Does the patient have less than a 3 day supply:  [x] Yes  [] No      Sammy Nolen Rep   09/30/24 09:07 EDT

## 2024-10-07 ENCOUNTER — OFFICE VISIT (OUTPATIENT)
Dept: FAMILY MEDICINE CLINIC | Facility: CLINIC | Age: 39
End: 2024-10-07
Payer: COMMERCIAL

## 2024-10-07 ENCOUNTER — LAB (OUTPATIENT)
Dept: FAMILY MEDICINE CLINIC | Facility: CLINIC | Age: 39
End: 2024-10-07
Payer: COMMERCIAL

## 2024-10-07 VITALS
DIASTOLIC BLOOD PRESSURE: 80 MMHG | BODY MASS INDEX: 24.65 KG/M2 | HEIGHT: 73 IN | WEIGHT: 186 LBS | SYSTOLIC BLOOD PRESSURE: 102 MMHG | HEART RATE: 113 BPM | OXYGEN SATURATION: 96 % | TEMPERATURE: 98 F | RESPIRATION RATE: 18 BRPM

## 2024-10-07 DIAGNOSIS — E11.65 TYPE 2 DIABETES MELLITUS WITH HYPERGLYCEMIA, WITH LONG-TERM CURRENT USE OF INSULIN: ICD-10-CM

## 2024-10-07 DIAGNOSIS — E87.6 HYPOKALEMIA: ICD-10-CM

## 2024-10-07 DIAGNOSIS — F90.9 ADULT ADHD: ICD-10-CM

## 2024-10-07 DIAGNOSIS — F17.211 CIGARETTE NICOTINE DEPENDENCE IN REMISSION: ICD-10-CM

## 2024-10-07 DIAGNOSIS — I10 PRIMARY HYPERTENSION: Primary | ICD-10-CM

## 2024-10-07 DIAGNOSIS — Z79.4 TYPE 2 DIABETES MELLITUS WITH HYPERGLYCEMIA, WITH LONG-TERM CURRENT USE OF INSULIN: ICD-10-CM

## 2024-10-07 LAB
AMPHET+METHAMPHET UR QL: POSITIVE
AMPHETAMINES UR QL: POSITIVE
BARBITURATES UR QL SCN: NEGATIVE
BENZODIAZ UR QL SCN: NEGATIVE
BUPRENORPHINE SERPL-MCNC: NEGATIVE NG/ML
CANNABINOIDS SERPL QL: POSITIVE
COCAINE UR QL: NEGATIVE
FENTANYL UR-MCNC: NEGATIVE NG/ML
METHADONE UR QL SCN: NEGATIVE
OPIATES UR QL: NEGATIVE
OXYCODONE UR QL SCN: NEGATIVE
PCP UR QL SCN: NEGATIVE
TRICYCLICS UR QL SCN: NEGATIVE

## 2024-10-07 PROCEDURE — 3046F HEMOGLOBIN A1C LEVEL >9.0%: CPT | Performed by: FAMILY MEDICINE

## 2024-10-07 PROCEDURE — 1160F RVW MEDS BY RX/DR IN RCRD: CPT | Performed by: FAMILY MEDICINE

## 2024-10-07 PROCEDURE — 36415 COLL VENOUS BLD VENIPUNCTURE: CPT | Performed by: FAMILY MEDICINE

## 2024-10-07 PROCEDURE — 99214 OFFICE O/P EST MOD 30 MIN: CPT | Performed by: FAMILY MEDICINE

## 2024-10-07 PROCEDURE — 80307 DRUG TEST PRSMV CHEM ANLYZR: CPT | Performed by: FAMILY MEDICINE

## 2024-10-07 PROCEDURE — 3074F SYST BP LT 130 MM HG: CPT | Performed by: FAMILY MEDICINE

## 2024-10-07 PROCEDURE — 1159F MED LIST DOCD IN RCRD: CPT | Performed by: FAMILY MEDICINE

## 2024-10-07 PROCEDURE — 1126F AMNT PAIN NOTED NONE PRSNT: CPT | Performed by: FAMILY MEDICINE

## 2024-10-07 PROCEDURE — 3079F DIAST BP 80-89 MM HG: CPT | Performed by: FAMILY MEDICINE

## 2024-10-07 PROCEDURE — 80048 BASIC METABOLIC PNL TOTAL CA: CPT | Performed by: FAMILY MEDICINE

## 2024-10-07 NOTE — PROGRESS NOTES
Follow Up Office Visit      Date: 10/07/2024   Patient Name: Colten Ogden  : 1985   MRN: [id number]     Chief Complaint:    Chief Complaint   Patient presents with    Follow-up    Diabetes    ADHD       History of Present Illness: Colten Ogden is a 39 y.o. male who is here today for follow-up.  Patient has been doing relatively well since last being seen.  He has purchased a new phone that may be beneficial with respect to his glucose monitor but has yet to download the kari.  Patient does continue to do well with his other medications without complaints.  He has not had any other concerns at present time.  Patient appears to be doing otherwise well.  They have continue with their medications without any side effects.  They have not had any changes in their usual activity, appetite and sleep.  Patient denies any other cardiovascular, respiratory, gastrointestinal, urologic or neurologic complaints.    Subjective      Review of Systems:   Review of Systems   Constitutional:  Negative for activity change, appetite change and fatigue.   Respiratory:  Negative for cough, chest tightness, shortness of breath and wheezing.    Cardiovascular:  Negative for chest pain, palpitations and leg swelling.   Gastrointestinal:  Negative for abdominal distention, abdominal pain, blood in stool, constipation, diarrhea, nausea, vomiting, GERD and indigestion.   Genitourinary:  Negative for difficulty urinating, dysuria, flank pain, frequency, hematuria and urgency.   Musculoskeletal:  Negative for arthralgias, back pain, gait problem, joint swelling and myalgias.   Neurological:  Negative for dizziness, tremors, seizures, syncope, weakness, light-headedness, numbness, headache and memory problem.   Psychiatric/Behavioral:  Negative for sleep disturbance and depressed mood. The patient is not nervous/anxious.        I have reviewed the patients family history, social history, past medical history, past surgical history  and have updated it as appropriate.     Medications:     Current Outpatient Medications:     amphetamine-dextroamphetamine (ADDERALL) 30 MG tablet, Take 0.5 tablets by mouth 2 (Two) Times a Day., Disp: 30 tablet, Rfl: 0    atorvastatin (LIPITOR) 80 MG tablet, TAKE ONE TABLET BY MOUTH EVERY DAY FOR CHOLESTEROL, Disp: 30 tablet, Rfl: 12    Blood Glucose Monitoring Suppl kit, 1 each 3 (Three) Times a Day., Disp: 1 each, Rfl: 0    Continuous Blood Gluc Sensor (FreeStyle Noemy 2 Sensor) misc, Use 1 each Every 14 (Fourteen) Days., Disp: 2 each, Rfl: 11    Continuous Glucose  (FreeStyle Noemy 2 Rose) device, Use 1 each Daily., Disp: 1 each, Rfl: 11    Diclofenac Sodium (VOLTAREN) 1 % gel gel, Apply 4 g topically to the appropriate area as directed 4 (Four) Times a Day., Disp: 350 g, Rfl: 11    ezetimibe (Zetia) 10 MG tablet, Take 1 tablet by mouth Daily., Disp: 90 tablet, Rfl: 3    Glucose Blood (Blood Glucose Test) strip, 1 each by In Vitro route 3 (Three) Times a Day., Disp: 100 each, Rfl: 11    Insulin Glargine (BASAGLAR KWIKPEN) 100 UNIT/ML injection pen, Inject 32 Units under the skin into the appropriate area as directed Every Night., Disp: 15 mL, Rfl: 3    Insulin Pen Needle (Pen Needles) 32G X 4 MM misc, Use 1 each Daily., Disp: 100 each, Rfl: 3    Jardiance 25 MG tablet tablet, Take 1 tablet by mouth Daily., Disp: 90 tablet, Rfl: 3    Lancets (OneTouch Delica Plus [id number]) misc, 1 each by Other route Daily., Disp: , Rfl:     lisinopril (PRINIVIL,ZESTRIL) 10 MG tablet, TAKE 1 TABLET BY MOUTH DAILY., Disp: 90 tablet, Rfl: 3    metFORMIN (GLUCOPHAGE) 1000 MG tablet, TAKE ONE TABLET BY MOUTH TWO TIMES A DAY WITH MEALS., Disp: 60 tablet, Rfl: 12    pioglitazone (ACTOS) 45 MG tablet, TAKE 1 TABLET BY MOUTH DAILY., Disp: 90 tablet, Rfl: 3    Allergies:   No Known Allergies    Immunizations:   Immunization History   Administered Date(s) Administered    COVID-19 (MODERNA) 1st,2nd,3rd Dose Monovalent 03/24/2021,  "04/21/2021    COVID-19 (UNSPECIFIED) 03/24/2021, 04/21/2021    Fluzone (or Fluarix & Flulaval for VFC) >6mos 09/14/2018, 11/02/2022    Hep B, Adolescent or Pediatric 02/13/2002, 03/18/2002, 08/13/2002    Influenza Seasonal Injectable 09/25/2009    Influenza, Unspecified 09/25/2009, 11/02/2022    Pneumococcal Conjugate 20-Valent (PCV20) 02/06/2023    Td (TDVAX) 08/13/2002    Tdap 02/06/2023        Objective     Physical Exam: Please see above  Vital Signs:   Vitals:    10/07/24 1107   BP: 102/80   BP Location: Left arm   Patient Position: Sitting   Cuff Size: Adult   Pulse: 113   Resp: 18   Temp: 98 °F (36.7 °C)   TempSrc: Temporal   SpO2: 96%   Weight: 84.4 kg (186 lb)   Height: 185.4 cm (72.99\")     Body mass index is 24.55 kg/m².  BMI is within normal parameters. No other follow-up for BMI required.       Physical Exam  Vitals and nursing note reviewed.   Constitutional:       Appearance: Normal appearance.   HENT:      Head: Normocephalic and atraumatic.      Nose: Nose normal.      Mouth/Throat:      Pharynx: Oropharynx is clear.   Eyes:      Extraocular Movements: Extraocular movements intact.      Pupils: Pupils are equal, round, and reactive to light.   Neck:      Thyroid: No thyroid mass or thyromegaly.      Trachea: Trachea normal.   Cardiovascular:      Rate and Rhythm: Normal rate and regular rhythm.      Pulses: Normal pulses. No decreased pulses.      Heart sounds: Normal heart sounds.   Pulmonary:      Effort: Pulmonary effort is normal.      Breath sounds: Normal breath sounds.   Abdominal:      General: Abdomen is flat. Bowel sounds are normal.      Palpations: Abdomen is soft.      Tenderness: There is no abdominal tenderness.   Musculoskeletal:      Cervical back: Neck supple.      Right lower leg: No edema.      Left lower leg: No edema.   Lymphadenopathy:      Cervical: No cervical adenopathy.   Skin:     General: Skin is warm and dry.   Neurological:      General: No focal deficit present.      " Mental Status: He is alert and oriented to person, place, and time.      Sensory: Sensation is intact.      Motor: Motor function is intact.      Coordination: Coordination is intact.   Psychiatric:         Attention and Perception: Attention normal.         Mood and Affect: Mood normal.         Speech: Speech normal.         Behavior: Behavior normal.         Procedures    Results:   Labs:   Hemoglobin A1C   Date Value Ref Range Status   09/05/2024 11.20 (H) 4.80 - 5.60 % Final     TSH   Date Value Ref Range Status   11/02/2022 1.730 0.270 - 4.200 uIU/mL Final        POCT Results (if applicable):   Results for orders placed or performed in visit on 09/05/24   Comprehensive Metabolic Panel    Specimen: Arm, Right; Blood   Result Value Ref Range    Glucose 84 65 - 99 mg/dL    BUN 19 6 - 20 mg/dL    Creatinine 1.04 0.76 - 1.27 mg/dL    Sodium 142 136 - 145 mmol/L    Potassium 3.3 (L) 3.5 - 5.2 mmol/L    Chloride 104 98 - 107 mmol/L    CO2 29.5 (H) 22.0 - 29.0 mmol/L    Calcium 10.4 8.6 - 10.5 mg/dL    Total Protein 7.7 6.0 - 8.5 g/dL    Albumin 4.9 3.5 - 5.2 g/dL    ALT (SGPT) 28 1 - 41 U/L    AST (SGOT) 14 1 - 40 U/L    Alkaline Phosphatase 78 39 - 117 U/L    Total Bilirubin 0.5 0.0 - 1.2 mg/dL    Globulin 2.8 gm/dL    A/G Ratio 1.8 g/dL    BUN/Creatinine Ratio 18.3 7.0 - 25.0    Anion Gap 8.5 5.0 - 15.0 mmol/L    eGFR 94.3 >60.0 mL/min/1.73   Hemoglobin A1c    Specimen: Arm, Right; Blood   Result Value Ref Range    Hemoglobin A1C 11.20 (H) 4.80 - 5.60 %   Lipid Panel    Specimen: Arm, Right; Blood   Result Value Ref Range    Total Cholesterol 109 0 - 200 mg/dL    Triglycerides 50 0 - 150 mg/dL    HDL Cholesterol 59 40 - 60 mg/dL    LDL Cholesterol  38 0 - 100 mg/dL    VLDL Cholesterol 12 5 - 40 mg/dL    LDL/HDL Ratio 0.68    BEARDEN Fibrosure    Specimen: Arm, Right; Blood   Result Value Ref Range    Fibrosis Score 0.13 0.00 - 0.21    Fibrosis Stage Comment     Steatosis Score (Reference) 0.26 0.00 - 0.40    Steatosis  Grade (Reference) Comment     BEARDEN Score (Reference) 0.00 0.00 - 0.25    Bearden Grade (Reference) Comment     Methodology: Comment     Alpha 2-Macroglobulins, Qn 187 110 - 276 mg/dL    Haptoglobin 175 17 - 317 mg/dL    Apolipoprotein A-1 149 101 - 178 mg/dL    Total Bilirubin 0.4 0.0 - 1.2 mg/dL    GGT 43 0 - 65 IU/L    ALT (SGPT) 32 0 - 55 IU/L    AST (SGOT) P5P (Reference) 18 0 - 40 IU/L    Cholesterol, Total (Reference) 112 100 - 199 mg/dL    Glucose, Serum (Reference) 85 70 - 99 mg/dL    Triglycerides 80 0 - 149 mg/dL    Interpretations: (Reference) Comment     Fibrosis Scoring: Comment     Steatosis Scoring Comment     BEARDEN Scoring Comment     Limitations: Comment     Comment Comment    MicroAlbumin, Urine, Random - Urine, Clean Catch    Specimen: Urine, Clean Catch   Result Value Ref Range    Microalbumin, Urine <1.2 mg/dL   Urinalysis without microscopic (no culture) - Urine, Clean Catch    Specimen: Urine, Clean Catch   Result Value Ref Range    Color, UA Yellow Yellow, Straw    Appearance, UA Clear Clear    pH, UA 6.0 5.0 - 8.0    Specific Gravity, UA >1.030 (H) 1.005 - 1.030    Glucose, UA Negative Negative    Ketones, UA Trace (A) Negative    Bilirubin, UA Negative Negative    Blood, UA Negative Negative    Protein, UA Trace (A) Negative    Leuk Esterase, UA Negative Negative    Nitrite, UA Negative Negative    Urobilinogen, UA 1.0 E.U./dL 0.2 - 1.0 E.U./dL   Urine Drug Screen - Urine, Clean Catch    Specimen: Urine, Clean Catch   Result Value Ref Range    THC, Screen, Urine Positive (A) Negative    Phencyclidine (PCP), Urine Negative Negative    Cocaine Screen, Urine Negative Negative    Methamphetamine, Ur Positive (A) Negative    Opiate Screen Negative Negative    Amphetamine Screen, Urine Positive (A) Negative    Benzodiazepine Screen, Urine Negative Negative    Tricyclic Antidepressants Screen Negative Negative    Methadone Screen, Urine Negative Negative    Barbiturates Screen, Urine Negative Negative     Oxycodone Screen, Urine Negative Negative    Buprenorphine, Screen, Urine Negative Negative   Fentanyl, Urine - Urine, Clean Catch    Specimen: Urine, Clean Catch   Result Value Ref Range    Fentanyl, Urine Negative Negative       Imaging:   No valid procedures specified.     Measures:   Advanced Care Planning:   Patient does not have an advance directive, information provided.    Smoking Cessation:   less than 3 minutes spent counseling Will try to cut down    Assessment / Plan      Assessment/Plan:   Diagnoses and all orders for this visit:    1. Primary hypertension (Primary)   Patient appears to be tolerating their blood pressure medicine without any side effects.  We will continue to monitor their blood pressure and will adjust to keep there is systolic blood pressure less than 130.  We will continue to monitor renal function and will make adjustments based on these findings.    2. Adult ADHD  Patient is doing very well with respect to their ADHD.  They are tolerating their medications well without any side effects.  They have not had any complaints of insomnia, agitation, anxiety, tachycardia excetra.  They understands the risk and benefits of medications and believe that things are doing well and does not wish to make any changes currently.  We will follow-up in 3 months time or sooner if there is any other questions or concerns.  -     Urine Drug Screen - Urine, Clean Catch; Future    3. Type 2 diabetes mellitus with hyperglycemia, with long-term current use of insulin   Patient does appear to be doing relatively well with respect to their diabetes.  They are tolerating their medications without complaints.  We will continue to follow their hemoglobin A1c and will make adjustments to keep their level less than 7%.    4. Hypokalemia  Patient did have low potassium and has developed some muscle cramps.  We will repeat a BMP and will pursue and treat accordingly.  -     Basic Metabolic Panel; Future    5.  Cigarette nicotine dependence in remission        Follow Up:   Return in about 4 weeks (around 11/4/2024).      At [facility], we believe that sharing information builds trust and better relationships. You are receiving this note because you recently visited [facility]. It is possible you will see health information before a provider has talked with you about it. This kind of information can be easy to misunderstand. To help you fully understand what it means for your health, we urge you to discuss this note with your provider.    Steven Rivas MD  [facility]

## 2024-10-08 ENCOUNTER — TELEPHONE (OUTPATIENT)
Dept: FAMILY MEDICINE CLINIC | Facility: CLINIC | Age: 39
End: 2024-10-08
Payer: COMMERCIAL

## 2024-10-08 LAB
ANION GAP SERPL CALCULATED.3IONS-SCNC: 14 MMOL/L (ref 5–15)
BUN SERPL-MCNC: 22 MG/DL (ref 6–20)
BUN/CREAT SERPL: 18.5 (ref 7–25)
CALCIUM SPEC-SCNC: 10.3 MG/DL (ref 8.6–10.5)
CHLORIDE SERPL-SCNC: 98 MMOL/L (ref 98–107)
CO2 SERPL-SCNC: 26 MMOL/L (ref 22–29)
CREAT SERPL-MCNC: 1.19 MG/DL (ref 0.76–1.27)
EGFRCR SERPLBLD CKD-EPI 2021: 80.2 ML/MIN/1.73
GLUCOSE SERPL-MCNC: 220 MG/DL (ref 65–99)
POTASSIUM SERPL-SCNC: 3.6 MMOL/L (ref 3.5–5.2)
SODIUM SERPL-SCNC: 138 MMOL/L (ref 136–145)

## 2024-10-08 NOTE — TELEPHONE ENCOUNTER
----- Message from Steven Rivas sent at 10/8/2024  5:33 AM EDT -----  Potassium has returned back to normal.  No changes at present time.

## 2024-10-08 NOTE — TELEPHONE ENCOUNTER
----- Message from Steven Rivas sent at 10/7/2024  5:50 PM EDT -----  Patient is still positive for methamphetamine.  If he continues to be positive for methamphetamine we will have to discontinue his treatment for ADHD.

## 2024-10-23 DIAGNOSIS — E11.65 TYPE 2 DIABETES MELLITUS WITH HYPERGLYCEMIA, WITH LONG-TERM CURRENT USE OF INSULIN: ICD-10-CM

## 2024-10-23 DIAGNOSIS — Z79.4 TYPE 2 DIABETES MELLITUS WITH HYPERGLYCEMIA, WITH LONG-TERM CURRENT USE OF INSULIN: ICD-10-CM

## 2024-10-23 RX ORDER — PEN NEEDLE, DIABETIC 32GX 5/32"
1 NEEDLE, DISPOSABLE MISCELLANEOUS DAILY
Qty: 30 EACH | Refills: 12 | Status: SHIPPED | OUTPATIENT
Start: 2024-10-23

## 2024-10-23 RX ORDER — INSULIN GLARGINE 100 [IU]/ML
32 INJECTION, SOLUTION SUBCUTANEOUS NIGHTLY
Qty: 15 ML | Refills: 12 | Status: SHIPPED | OUTPATIENT
Start: 2024-10-23

## 2024-10-28 DIAGNOSIS — F90.9 ADULT ADHD: ICD-10-CM

## 2024-10-28 RX ORDER — DEXTROAMPHETAMINE SACCHARATE, AMPHETAMINE ASPARTATE, DEXTROAMPHETAMINE SULFATE AND AMPHETAMINE SULFATE 7.5; 7.5; 7.5; 7.5 MG/1; MG/1; MG/1; MG/1
0.5 TABLET ORAL 2 TIMES DAILY
Qty: 30 TABLET | Refills: 0 | Status: SHIPPED | OUTPATIENT
Start: 2024-10-28

## 2024-10-28 NOTE — TELEPHONE ENCOUNTER
Caller: Colten Ogden    Relationship: Self    Best call back number: [phone]     Requested Prescriptions:   Requested Prescriptions     Pending Prescriptions Disp Refills    amphetamine-dextroamphetamine (ADDERALL) 30 MG tablet 30 tablet 0     Sig: Take 0.5 tablets by mouth 2 (Two) Times a Day.        [address] [facility] - [phone]  - [phone] FX     Last office visit with prescribing clinician: 10/7/2024   Last telemedicine visit with prescribing clinician: Visit date not found   Next office visit with prescribing clinician: 11/4/2024     Additional details provided by patient: PATIENT HAS 2 DAYS LEFT OF THIS MEDICATION.    Does the patient have less than a 3 day supply:  [x] Yes  [] No    Would you like a call back once the refill request has been completed: [] Yes [x] No    If the office needs to give you a call back, can they leave a voicemail: [] Yes [x] No    Sammy Cho Rep   10/28/24 15:23 EDT

## 2024-11-07 ENCOUNTER — OFFICE VISIT (OUTPATIENT)
Dept: FAMILY MEDICINE CLINIC | Facility: CLINIC | Age: 39
End: 2024-11-07
Payer: COMMERCIAL

## 2024-11-07 ENCOUNTER — LAB (OUTPATIENT)
Dept: FAMILY MEDICINE CLINIC | Facility: CLINIC | Age: 39
End: 2024-11-07
Payer: COMMERCIAL

## 2024-11-07 VITALS
DIASTOLIC BLOOD PRESSURE: 80 MMHG | WEIGHT: 200.6 LBS | TEMPERATURE: 98.4 F | RESPIRATION RATE: 18 BRPM | HEIGHT: 73 IN | SYSTOLIC BLOOD PRESSURE: 124 MMHG | OXYGEN SATURATION: 97 % | HEART RATE: 100 BPM | BODY MASS INDEX: 26.59 KG/M2

## 2024-11-07 DIAGNOSIS — Z79.4 TYPE 2 DIABETES MELLITUS WITH HYPERGLYCEMIA, WITH LONG-TERM CURRENT USE OF INSULIN: Primary | ICD-10-CM

## 2024-11-07 DIAGNOSIS — E11.65 TYPE 2 DIABETES MELLITUS WITH HYPERGLYCEMIA, WITH LONG-TERM CURRENT USE OF INSULIN: Primary | ICD-10-CM

## 2024-11-07 DIAGNOSIS — M79.674 GREAT TOE PAIN, RIGHT: ICD-10-CM

## 2024-11-07 DIAGNOSIS — F90.9 ADULT ADHD: ICD-10-CM

## 2024-11-07 DIAGNOSIS — I10 PRIMARY HYPERTENSION: ICD-10-CM

## 2024-11-07 LAB
AMPHET+METHAMPHET UR QL: POSITIVE
AMPHETAMINES UR QL: POSITIVE
BARBITURATES UR QL SCN: NEGATIVE
BENZODIAZ UR QL SCN: NEGATIVE
BUPRENORPHINE SERPL-MCNC: NEGATIVE NG/ML
CANNABINOIDS SERPL QL: NEGATIVE
COCAINE UR QL: NEGATIVE
CRP SERPL-MCNC: <0.3 MG/DL (ref 0–0.5)
DEPRECATED RDW RBC AUTO: 41.1 FL (ref 37–54)
ERYTHROCYTE [DISTWIDTH] IN BLOOD BY AUTOMATED COUNT: 12.2 % (ref 12.3–15.4)
ERYTHROCYTE [SEDIMENTATION RATE] IN BLOOD: 8 MM/HR (ref 0–15)
FENTANYL UR-MCNC: NEGATIVE NG/ML
HCT VFR BLD AUTO: 40.7 % (ref 37.5–51)
HGB BLD-MCNC: 13.7 G/DL (ref 13–17.7)
MCH RBC QN AUTO: 31.1 PG (ref 26.6–33)
MCHC RBC AUTO-ENTMCNC: 33.7 G/DL (ref 31.5–35.7)
MCV RBC AUTO: 92.5 FL (ref 79–97)
METHADONE UR QL SCN: NEGATIVE
OPIATES UR QL: NEGATIVE
OXYCODONE UR QL SCN: NEGATIVE
PCP UR QL SCN: NEGATIVE
PLATELET # BLD AUTO: 275 10*3/MM3 (ref 140–450)
PMV BLD AUTO: 9.9 FL (ref 6–12)
RBC # BLD AUTO: 4.4 10*6/MM3 (ref 4.14–5.8)
TRICYCLICS UR QL SCN: NEGATIVE
URATE SERPL-MCNC: 3 MG/DL (ref 3.4–7)
WBC NRBC COR # BLD AUTO: 5.52 10*3/MM3 (ref 3.4–10.8)

## 2024-11-07 PROCEDURE — 1159F MED LIST DOCD IN RCRD: CPT | Performed by: FAMILY MEDICINE

## 2024-11-07 PROCEDURE — 1126F AMNT PAIN NOTED NONE PRSNT: CPT | Performed by: FAMILY MEDICINE

## 2024-11-07 PROCEDURE — 3046F HEMOGLOBIN A1C LEVEL >9.0%: CPT | Performed by: FAMILY MEDICINE

## 2024-11-07 PROCEDURE — 3074F SYST BP LT 130 MM HG: CPT | Performed by: FAMILY MEDICINE

## 2024-11-07 PROCEDURE — 1160F RVW MEDS BY RX/DR IN RCRD: CPT | Performed by: FAMILY MEDICINE

## 2024-11-07 PROCEDURE — 85027 COMPLETE CBC AUTOMATED: CPT | Performed by: FAMILY MEDICINE

## 2024-11-07 PROCEDURE — 84550 ASSAY OF BLOOD/URIC ACID: CPT | Performed by: FAMILY MEDICINE

## 2024-11-07 PROCEDURE — 86140 C-REACTIVE PROTEIN: CPT | Performed by: FAMILY MEDICINE

## 2024-11-07 PROCEDURE — 85007 BL SMEAR W/DIFF WBC COUNT: CPT | Performed by: FAMILY MEDICINE

## 2024-11-07 PROCEDURE — 36415 COLL VENOUS BLD VENIPUNCTURE: CPT | Performed by: FAMILY MEDICINE

## 2024-11-07 PROCEDURE — 85652 RBC SED RATE AUTOMATED: CPT | Performed by: FAMILY MEDICINE

## 2024-11-07 PROCEDURE — 3079F DIAST BP 80-89 MM HG: CPT | Performed by: FAMILY MEDICINE

## 2024-11-07 PROCEDURE — 80307 DRUG TEST PRSMV CHEM ANLYZR: CPT | Performed by: FAMILY MEDICINE

## 2024-11-07 PROCEDURE — 99214 OFFICE O/P EST MOD 30 MIN: CPT | Performed by: FAMILY MEDICINE

## 2024-11-07 NOTE — PROGRESS NOTES
Follow Up Office Visit      Date: 2024   Patient Name: Colten Ogden  : 1985   MRN: [id number]     Chief Complaint:    Chief Complaint   Patient presents with    Follow-up     4 WEEK       History of Present Illness: Colten Ogden is a 39 y.o. male who is here today for follow-up.  Patient has yet to start checking his blood sugars.  He has not modified his diet.  He has no idea what his blood sugars have been running but is pretty certain that he has not had any hypoglycemic episodes.  Patient states that he is not abusing any drugs but does have acquaintances that apparently used methamphetamine.  Patient denies any issues at present time with his current regiment.  He has not had any side effects that he knows of.  Patient is uncertain why he does not check his blood sugars.  Patient has no other concerns at present time.  Patient appears to be doing otherwise well.  They have continue with their medications without any side effects.  They have not had any changes in their usual activity, appetite and sleep.  Patient denies any other cardiovascular, respiratory, gastrointestinal, urologic or neurologic complaints.    History of Present Illness         Subjective      Review of Systems:   Review of Systems   Constitutional:  Negative for activity change, appetite change and fatigue.   Respiratory:  Negative for cough, chest tightness, shortness of breath and wheezing.    Cardiovascular:  Negative for chest pain, palpitations and leg swelling.   Gastrointestinal:  Negative for abdominal distention, abdominal pain, blood in stool, constipation, diarrhea, nausea, vomiting, GERD and indigestion.   Genitourinary:  Negative for difficulty urinating, dysuria, flank pain, frequency, hematuria and urgency.   Musculoskeletal:  Negative for arthralgias, back pain, gait problem, joint swelling and myalgias.   Neurological:  Negative for dizziness, tremors, seizures, syncope, weakness, light-headedness,  numbness, headache and memory problem.   Psychiatric/Behavioral:  Negative for sleep disturbance and depressed mood. The patient is not nervous/anxious.        I have reviewed the patients family history, social history, past medical history, past surgical history and have updated it as appropriate.     Medications:     Current Outpatient Medications:     amphetamine-dextroamphetamine (ADDERALL) 30 MG tablet, Take 0.5 tablets by mouth 2 (Two) Times a Day., Disp: 30 tablet, Rfl: 0    atorvastatin (LIPITOR) 80 MG tablet, TAKE ONE TABLET BY MOUTH EVERY DAY FOR CHOLESTEROL, Disp: 30 tablet, Rfl: 12    BD Pen Needle Rosario U/F 32G X 4 MM misc, USE 1 DAILY., Disp: 30 each, Rfl: 12    Blood Glucose Monitoring Suppl kit, 1 each 3 (Three) Times a Day., Disp: 1 each, Rfl: 0    Continuous Blood Gluc Sensor (FreeStyle Noemy 2 Sensor) misc, Use 1 each Every 14 (Fourteen) Days., Disp: 2 each, Rfl: 11    Continuous Glucose  (FreeStyle Noemy 2 [place]) device, Use 1 each Daily., Disp: 1 each, Rfl: 11    Diclofenac Sodium (VOLTAREN) 1 % gel gel, Apply 4 g topically to the appropriate area as directed 4 (Four) Times a Day., Disp: 350 g, Rfl: 11    ezetimibe (Zetia) 10 MG tablet, Take 1 tablet by mouth Daily., Disp: 90 tablet, Rfl: 3    Glucose Blood (Blood Glucose Test) strip, 1 each by In Vitro route 3 (Three) Times a Day., Disp: 100 each, Rfl: 11    Insulin Glargine (BASAGLAR KWIKPEN) 100 UNIT/ML injection pen, INJECT 32 UNITS UNDER THE SKIN INTO THE APPROPRIATE AREA AS DIRECTED EVERY NIGHT., Disp: 15 mL, Rfl: 12    Jardiance 25 MG tablet tablet, Take 1 tablet by mouth Daily., Disp: 90 tablet, Rfl: 3    Lancets (OneTouch Delica Plus Hrzabu12Z) misc, 1 each by Other route Daily., Disp: , Rfl:     lisinopril (PRINIVIL,ZESTRIL) 10 MG tablet, TAKE 1 TABLET BY MOUTH DAILY., Disp: 90 tablet, Rfl: 3    metFORMIN (GLUCOPHAGE) 1000 MG tablet, TAKE ONE TABLET BY MOUTH TWO TIMES A DAY WITH MEALS., Disp: 60 tablet, Rfl: 12    pioglitazone  "(ACTOS) 45 MG tablet, TAKE 1 TABLET BY MOUTH DAILY., Disp: 90 tablet, Rfl: 3    Allergies:   No Known Allergies    Immunizations:   Immunization History   Administered Date(s) Administered    COVID-19 (MODERNA) 1st,2nd,3rd Dose Monovalent 03/24/2021, 04/21/2021    COVID-19 (UNSPECIFIED) 03/24/2021, 04/21/2021    Fluzone (or Fluarix & Flulaval for VFC) >6mos 09/14/2018, 11/02/2022    Hep B, Adolescent or Pediatric 02/13/2002, 03/18/2002, 08/13/2002    Influenza Seasonal Injectable 09/25/2009    Influenza, Unspecified 09/25/2009, 11/02/2022    Pneumococcal Conjugate 20-Valent (PCV20) 02/06/2023    Td (TDVAX) 08/13/2002    Tdap 02/06/2023        Objective     Physical Exam: Please see above  Vital Signs:   Vitals:    11/07/24 1451   BP: 124/80   BP Location: Left arm   Patient Position: Sitting   Cuff Size: Adult   Pulse: 100   Resp: 18   Temp: 98.4 °F (36.9 °C)   TempSrc: Temporal   SpO2: 97%   Weight: 91 kg (200 lb 9.6 oz)   Height: 185.4 cm (72.99\")     Body mass index is 26.47 kg/m².          Physical Exam  Vitals and nursing note reviewed.   Constitutional:       Appearance: Normal appearance.   HENT:      Head: Normocephalic and atraumatic.      Nose: Nose normal.      Mouth/Throat:      Pharynx: Oropharynx is clear.   Eyes:      Extraocular Movements: Extraocular movements intact.      Pupils: Pupils are equal, round, and reactive to light.   Neck:      Thyroid: No thyroid mass or thyromegaly.      Trachea: Trachea normal.   Cardiovascular:      Rate and Rhythm: Normal rate and regular rhythm.      Pulses: Normal pulses. No decreased pulses.      Heart sounds: Normal heart sounds.   Pulmonary:      Effort: Pulmonary effort is normal.      Breath sounds: Normal breath sounds.   Abdominal:      General: Abdomen is flat. Bowel sounds are normal.      Palpations: Abdomen is soft.      Tenderness: There is no abdominal tenderness.   Musculoskeletal:      Cervical back: Neck supple.      Right lower leg: No edema.     "  Left lower leg: No edema.   Lymphadenopathy:      Cervical: No cervical adenopathy.   Skin:     General: Skin is warm and dry.   Neurological:      General: No focal deficit present.      Mental Status: He is alert and oriented to person, place, and time.      Sensory: Sensation is intact.      Motor: Motor function is intact.      Coordination: Coordination is intact.   Psychiatric:         Attention and Perception: Attention normal.         Mood and Affect: Mood normal.         Speech: Speech normal.         Behavior: Behavior normal.         Procedures    Results:   Labs:   Hemoglobin A1C   Date Value Ref Range Status   09/05/2024 11.20 (H) 4.80 - 5.60 % Final     TSH   Date Value Ref Range Status   11/02/2022 1.730 0.270 - 4.200 uIU/mL Final        POCT Results (if applicable):   Results for orders placed or performed in visit on 11/07/24   Urine Drug Screen - Urine, Clean Catch    Collection Time: 11/07/24  4:12 PM    Specimen: Urine, Clean Catch   Result Value Ref Range    THC, Screen, Urine Negative Negative    Phencyclidine (PCP), Urine Negative Negative    Cocaine Screen, Urine Negative Negative    Methamphetamine, Ur Positive (A) Negative    Opiate Screen Negative Negative    Amphetamine Screen, Urine Positive (A) Negative    Benzodiazepine Screen, Urine Negative Negative    Tricyclic Antidepressants Screen Negative Negative    Methadone Screen, Urine Negative Negative    Barbiturates Screen, Urine Negative Negative    Oxycodone Screen, Urine Negative Negative    Buprenorphine, Screen, Urine Negative Negative   Fentanyl, Urine - Urine, Clean Catch    Collection Time: 11/07/24  4:12 PM    Specimen: Urine, Clean Catch   Result Value Ref Range    Fentanyl, Urine Negative Negative       Imaging:   No valid procedures specified.     Measures:   Advanced Care Planning:   Patient does not have an advance directive, information provided.    Smoking Cessation:   Non-smoker.    Assessment / Plan   "    Assessment/Plan:   Diagnoses and all orders for this visit:    1. Type 2 diabetes mellitus with hyperglycemia, with long-term current use of insulin (Primary)   Patient does appear to be doing relatively well with respect to their diabetes.  They are tolerating their medications without complaints.  We will continue to follow their hemoglobin A1c and will make adjustments to keep their level less than 7%.  Unfortunately patient has not been checking his blood sugars.  He is giving himself the insulin on \"most days\".  He has seen dietitians and other providers in the past without much success.  Unfortunately, I am uncertain if I am the provider that he needs as he is noncompliant with any recommendations.  We have encouraged him to start monitoring his blood sugars and following his diet with daily administration of his insulin.  We may consider referral to endocrinologist to see if they can help modify this potentially fatal illness.    2. Primary hypertension   Patient appears to be tolerating their blood pressure medicine without any side effects.  We will continue to monitor their blood pressure and will adjust to keep there is systolic blood pressure less than 130.  We will continue to monitor renal function and will make adjustments based on these findings.    3. Adult ADHD  Patient is doing very well with respect to their ADHD.  They are tolerating their medications well without any side effects.  They have not had any complaints of insomnia, agitation, anxiety, tachycardia excetra.  They understands the risk and benefits of medications and believe that things are doing well and does not wish to make any changes currently.  We will follow-up in 3 months time or sooner if there is any other questions or concerns.  Urinalysis did return back as positive for methamphetamine.  We have instructed the patient that if he continues to have methamphetamine in his urine that we will have to discontinue medications.  " "Patient states that he is the one not using the medications but his acquaintances.  We have encouraged him to stay away from the seeing \"acquaintances\" when they are using their illegal substance.  Patient understands that if he continues to have a positive urine drug screen we will have to discontinue his medications.  -     Urine Drug Screen - Urine, Clean Catch; Future  -     Urine Drug Screen - Urine, Clean Catch  -     Fentanyl, Urine - Urine, Clean Catch    4. Great toe pain, right  Patient is having pain of his right great toe at the MP joint.  He has no trauma.  He has not had problems with gout in the past.  We will obtain x-ray as well as laboratory data to assure his there is no underlying pathology.  -     XR Foot 3+ View Right; Future  -     Uric Acid; Future  -     CBC With Manual Differential; Future  -     Sedimentation Rate; Future  -     C-reactive Protein; Future        Follow Up:   Return in 1 month (on 12/10/2024).      At [facility], we believe that sharing information builds trust and better relationships. You are receiving this note because you recently visited [facility]. It is possible you will see health information before a provider has talked with you about it. This kind of information can be easy to misunderstand. To help you fully understand what it means for your health, we urge you to discuss this note with your provider.    Steven Rivas MD  [facility]       "

## 2024-11-08 ENCOUNTER — TELEPHONE (OUTPATIENT)
Dept: FAMILY MEDICINE CLINIC | Facility: CLINIC | Age: 39
End: 2024-11-08
Payer: COMMERCIAL

## 2024-11-08 LAB
ANISOCYTOSIS BLD QL: NORMAL
BASOPHILS # BLD MANUAL: 0 10*3/MM3 (ref 0–0.2)
BASOPHILS NFR BLD MANUAL: 0 % (ref 0–1.5)
DACRYOCYTES BLD QL SMEAR: NORMAL
EOSINOPHIL # BLD MANUAL: 0.06 10*3/MM3 (ref 0–0.4)
EOSINOPHIL NFR BLD MANUAL: 1 % (ref 0.3–6.2)
LYMPHOCYTES # BLD MANUAL: 2.04 10*3/MM3 (ref 0.7–3.1)
LYMPHOCYTES NFR BLD MANUAL: 7 % (ref 5–12)
MACROCYTES BLD QL SMEAR: NORMAL
MONOCYTES # BLD: 0.39 10*3/MM3 (ref 0.1–0.9)
NEUTROPHILS # BLD AUTO: 3.04 10*3/MM3 (ref 1.7–7)
NEUTROPHILS NFR BLD MANUAL: 55 % (ref 42.7–76)
PLAT MORPH BLD: NORMAL
POIKILOCYTOSIS BLD QL SMEAR: NORMAL
SPHEROCYTES BLD QL SMEAR: NORMAL
VARIANT LYMPHS NFR BLD MANUAL: 37 % (ref 19.6–45.3)
WBC MORPH BLD: NORMAL

## 2024-11-08 NOTE — TELEPHONE ENCOUNTER
----- Message from Steven Rivas sent at 11/8/2024  7:01 AM EST -----  Laboratory data does suggest against a inflammatory arthritis of causing pain in his big toe.  Urinalysis was positive for methamphetamine again.  If he has another urinalysis positive for methamphetamine we will have to discontinue his treatment for ADHD.

## 2024-11-11 ENCOUNTER — TELEPHONE (OUTPATIENT)
Dept: FAMILY MEDICINE CLINIC | Facility: CLINIC | Age: 39
End: 2024-11-11
Payer: COMMERCIAL

## 2024-11-11 NOTE — TELEPHONE ENCOUNTER
Attempted to reach the patient regarding results but was unable to contact them or leave a voicemail.     Relay

## 2024-11-11 NOTE — TELEPHONE ENCOUNTER
----- Message from Steven Rivas sent at 11/8/2024 10:08 PM EST -----  X-ray did not reveal any abnormality.

## 2024-11-12 NOTE — TELEPHONE ENCOUNTER
Attempted to reach the patient regarding results but was unable to contact them or leave a voicemail.   Called and spoke with mother asking her to have patient call us back.

## 2024-11-22 DIAGNOSIS — Z79.4 TYPE 2 DIABETES MELLITUS WITH HYPERGLYCEMIA, WITH LONG-TERM CURRENT USE OF INSULIN: ICD-10-CM

## 2024-11-22 DIAGNOSIS — E11.65 TYPE 2 DIABETES MELLITUS WITH HYPERGLYCEMIA, WITH LONG-TERM CURRENT USE OF INSULIN: ICD-10-CM

## 2024-11-29 DIAGNOSIS — F90.9 ADULT ADHD: ICD-10-CM

## 2024-12-01 RX ORDER — DEXTROAMPHETAMINE SACCHARATE, AMPHETAMINE ASPARTATE, DEXTROAMPHETAMINE SULFATE AND AMPHETAMINE SULFATE 7.5; 7.5; 7.5; 7.5 MG/1; MG/1; MG/1; MG/1
TABLET ORAL
Qty: 30 TABLET | Refills: 0 | Status: SHIPPED | OUTPATIENT
Start: 2024-12-01

## 2024-12-23 ENCOUNTER — OFFICE VISIT (OUTPATIENT)
Dept: FAMILY MEDICINE CLINIC | Facility: CLINIC | Age: 39
End: 2024-12-23
Payer: COMMERCIAL

## 2024-12-23 VITALS
DIASTOLIC BLOOD PRESSURE: 86 MMHG | OXYGEN SATURATION: 98 % | BODY MASS INDEX: 26.11 KG/M2 | RESPIRATION RATE: 18 BRPM | TEMPERATURE: 98 F | HEIGHT: 73 IN | WEIGHT: 197 LBS | SYSTOLIC BLOOD PRESSURE: 130 MMHG | HEART RATE: 126 BPM

## 2024-12-23 DIAGNOSIS — E87.6 HYPOKALEMIA: ICD-10-CM

## 2024-12-23 DIAGNOSIS — Z79.4 TYPE 2 DIABETES MELLITUS WITH HYPERGLYCEMIA, WITH LONG-TERM CURRENT USE OF INSULIN: ICD-10-CM

## 2024-12-23 DIAGNOSIS — I10 PRIMARY HYPERTENSION: ICD-10-CM

## 2024-12-23 DIAGNOSIS — Z78.9 ELECTRONIC CIGARETTE USE: ICD-10-CM

## 2024-12-23 DIAGNOSIS — Z00.00 WELL ADULT EXAM: Primary | ICD-10-CM

## 2024-12-23 DIAGNOSIS — F90.9 ADULT ADHD: ICD-10-CM

## 2024-12-23 DIAGNOSIS — E11.65 TYPE 2 DIABETES MELLITUS WITH HYPERGLYCEMIA, WITH LONG-TERM CURRENT USE OF INSULIN: ICD-10-CM

## 2024-12-23 DIAGNOSIS — E78.2 MIXED HYPERLIPIDEMIA: ICD-10-CM

## 2024-12-23 PROCEDURE — 99395 PREV VISIT EST AGE 18-39: CPT | Performed by: FAMILY MEDICINE

## 2024-12-23 PROCEDURE — 2014F MENTAL STATUS ASSESS: CPT | Performed by: FAMILY MEDICINE

## 2024-12-23 PROCEDURE — 3046F HEMOGLOBIN A1C LEVEL >9.0%: CPT | Performed by: FAMILY MEDICINE

## 2024-12-23 PROCEDURE — 1159F MED LIST DOCD IN RCRD: CPT | Performed by: FAMILY MEDICINE

## 2024-12-23 PROCEDURE — 3079F DIAST BP 80-89 MM HG: CPT | Performed by: FAMILY MEDICINE

## 2024-12-23 PROCEDURE — 1160F RVW MEDS BY RX/DR IN RCRD: CPT | Performed by: FAMILY MEDICINE

## 2024-12-23 PROCEDURE — 3075F SYST BP GE 130 - 139MM HG: CPT | Performed by: FAMILY MEDICINE

## 2024-12-23 PROCEDURE — 1126F AMNT PAIN NOTED NONE PRSNT: CPT | Performed by: FAMILY MEDICINE

## 2024-12-23 RX ORDER — INSULIN GLARGINE 100 [IU]/ML
40 INJECTION, SOLUTION SUBCUTANEOUS NIGHTLY
Start: 2024-12-23

## 2024-12-23 NOTE — PROGRESS NOTES
Male Physical Note      Date: 2024   Patient Name: Colten Ogden  : 1985   MRN: [id number]     Chief Complaint:    Chief Complaint   Patient presents with    Annual Exam    Diabetes    Follow-up       History of Present Illness: Colten Ogden is a 39 y.o. male who is here today for their annual health maintenance and physical.  Patient also returns to clinic for follow-up of his medical conditions.    History of Present Illness  The patient is a 39-year-old male who presents for evaluation of diabetes, ADHD, blood pressure management, cholesterol management, foot pain, and health maintenance.    He reports that his blood glucose levels have been slightly elevated, with readings around 205 during the day, a significant improvement from previous readings in the 300s. He has not been monitoring his morning blood glucose levels. He had intended to apply a continuous glucose monitor (CGM) this morning but forgot. He finds the CGM alerts on his phone somewhat bothersome. He has observed an increase in urination over the past few weeks. He has an upcoming ophthalmology appointment next month. His current insulin regimen includes 40 units at night, which he increases to 45 units when his blood glucose levels exceed 200. He continues to take pioglitazone, metformin, and Jardiance.    He reports no issues with his ADHD medication, although he experienced a single episode of shakiness, which he attributes to fatigue rather than hypoglycemia.    He continues to take his antihypertensive medications without any complications.    He spends a lot of time standing on his feet in the garage. He has not noticed any swelling.    He is still taking his cholesterol medication, Lipitor and Zetia, and is doing well with them.    He has not received his influenza vaccine this year or the previous year. He does not endorse experiencing anxiety or depression. He occasionally experiences difficulty breathing, which he  attributes to vaping.    SOCIAL HISTORY  He vapes occasionally.    MEDICATIONS  Current: Basaglar, pioglitazone, metformin, Jardiance, Adderall, Lipitor, Zetia    IMMUNIZATIONS  He has not had his influenza vaccine in 2 years.     Patient appears to be doing otherwise well.  They have continue with their medications without any side effects.  They have not had any changes in their usual activity, appetite and sleep.  Patient denies any other cardiovascular, respiratory, gastrointestinal, urologic or neurologic complaints.    Subjective      Review of Systems:   Review of Systems   Constitutional:  Negative for activity change, appetite change and fatigue.   Respiratory:  Negative for cough, chest tightness, shortness of breath and wheezing.    Cardiovascular:  Negative for chest pain, palpitations and leg swelling.   Gastrointestinal:  Negative for abdominal distention, abdominal pain, blood in stool, constipation, diarrhea, nausea, vomiting, GERD and indigestion.   Genitourinary:  Negative for difficulty urinating, dysuria, flank pain, frequency, hematuria and urgency.   Musculoskeletal:  Negative for arthralgias, back pain, gait problem, joint swelling and myalgias.   Neurological:  Negative for dizziness, tremors, seizures, syncope, weakness, light-headedness, numbness, headache and memory problem.   Psychiatric/Behavioral:  Negative for sleep disturbance and depressed mood. The patient is not nervous/anxious.        Past Medical History, Social History, Family History and Care Team were all reviewed with patient and updated as appropriate.     Medications:     Current Outpatient Medications:     amphetamine-dextroamphetamine (ADDERALL) 30 MG tablet, TAKE 1/2 TABLET TWO TIMES A DAY, Disp: 30 tablet, Rfl: 0    atorvastatin (LIPITOR) 80 MG tablet, TAKE ONE TABLET BY MOUTH EVERY DAY FOR CHOLESTEROL, Disp: 30 tablet, Rfl: 12    BD Pen Needle Rosario U/F 32G X 4 MM misc, USE 1 DAILY., Disp: 30 each, Rfl: 12    Blood  Glucose Monitoring Suppl kit, 1 each 3 (Three) Times a Day., Disp: 1 each, Rfl: 0    Continuous Glucose  (FreeStyle Noemy 2 [place]) device, Use 1 each Daily., Disp: 1 each, Rfl: 11    Continuous Glucose Sensor (FreeStyle Noemy 2 Sensor) misc, USE 1 EACH 14 DAYS, Disp: 2 each, Rfl: 11    Diclofenac Sodium (VOLTAREN) 1 % gel gel, Apply 4 g topically to the appropriate area as directed 4 (Four) Times a Day., Disp: 350 g, Rfl: 11    ezetimibe (Zetia) 10 MG tablet, Take 1 tablet by mouth Daily., Disp: 90 tablet, Rfl: 3    Glucose Blood (Blood Glucose Test) strip, 1 each by In Vitro route 3 (Three) Times a Day., Disp: 100 each, Rfl: 11    Insulin Glargine (BASAGLAR KWIKPEN) 100 UNIT/ML injection pen, Inject 40 Units under the skin into the appropriate area as directed Every Night., Disp: , Rfl:     Jardiance 25 MG tablet tablet, Take 1 tablet by mouth Daily., Disp: 90 tablet, Rfl: 3    Lancets (OneTouch Delica Plus [id number]) misc, 1 each by Other route Daily., Disp: , Rfl:     lisinopril (PRINIVIL,ZESTRIL) 10 MG tablet, TAKE 1 TABLET BY MOUTH DAILY., Disp: 90 tablet, Rfl: 3    metFORMIN (GLUCOPHAGE) 1000 MG tablet, TAKE ONE TABLET BY MOUTH TWO TIMES A DAY WITH MEALS., Disp: 60 tablet, Rfl: 12    pioglitazone (ACTOS) 45 MG tablet, TAKE 1 TABLET BY MOUTH DAILY., Disp: 90 tablet, Rfl: 3    Allergies:   No Known Allergies    Immunizations:  Health Maintenance Summary            Overdue - DIABETIC EYE EXAM (Yearly) Overdue since 11/1/2023 11/02/2023  Postponed until 12/21/2023 by Steven Rivas MD (Pending event - Advised.)    11/01/2022  Done              Overdue - BMI FOLLOWUP (Yearly) Overdue since 12/11/2024 12/11/2023  [employer]: WORKFLOW - QUALITY MEASUREMENT - DOCUMENTED WEIGHT FOLLOW-UP PLAN    12/05/2022  Smart[employer]: WORKFLOW - QUALITY MEASUREMENT - DOCUMENTED WEIGHT FOLLOW-UP PLAN              Ordered - HEMOGLOBIN A1C (Every 6 Months) Ordered on 12/23/2024 09/05/2024  Hemoglobin  A1C component of Hemoglobin A1c    03/05/2024  Hemoglobin A1C component of Hemoglobin A1c    10/02/2023  Hemoglobin A1C component of Hemoglobin A1c    05/05/2023  Hemoglobin A1C component of Hemoglobin A1c    02/06/2023  Hemoglobin A1C component of Hemoglobin A1c    Only the first 5 history entries have been loaded, but more history exists.              Postponed - INFLUENZA VACCINE (Yearly - July to March) Postponed until 3/31/2025      12/23/2024  Postponed until 3/31/2025 by Steven Rivas MD (Patient Refused)    11/02/2023  Postponed until 3/31/2024 by Steven Rivas MD (Pending event - Advised.)    11/02/2022  Imm Admin: Fluzone (or Fluarix & Flulaval for VFC) >6mos    11/02/2022  Imm Admin: Influenza, Unspecified    09/14/2018  Imm Admin: Fluzone (or Fluarix & Flulaval for VFC) >6mos    Only the first 5 history entries have been loaded, but more history exists.              Postponed - COVID-19 Vaccine (5 - 2024-25 season) Postponed until 9/4/2025 09/05/2024  Postponed until 9/4/2025 by Steven Rivas MD (Patient Refused)    06/05/2024  Postponed until 6/5/2025 by Charlee Velazquez MA (Patient Refused)    10/02/2023  Postponed until 4/11/2024 by Steven Rivas MD (Patient Refused)    02/06/2023  Postponed until 2/25/2024 by Steven Rivas MD (Pending event - Advised.)    04/21/2021  Imm Admin: COVID-19 (UNSPECIFIED)    Only the first 5 history entries have been loaded, but more history exists.              DIABETIC FOOT EXAM (Yearly) Next due on 3/5/2025      03/05/2024  Done    02/06/2023  Done              Ordered - LIPID PANEL (Yearly) Ordered on 12/23/2024 09/05/2024  Lipid Panel    03/05/2024  Lipid Panel    10/02/2023  Lipid Panel    05/05/2023  Lipid Panel    02/06/2023  Lipid Panel    Only the first 5 history entries have been loaded, but more history exists.              ANNUAL PHYSICAL (Yearly) Next due on 12/23/2025 12/23/2024   Done    11/02/2023  Done              TDAP/TD VACCINES (3 - Td or Tdap) Next due on 2/6/2033 02/06/2023  Postponed until 2/6/2024 by Steven Rivas MD (Pending event - Advised.)    02/06/2023  Imm Admin: Tdap    08/13/2002  Imm Admin: Td (TDVAX)              Hepatitis B (Series Information) Completed      08/13/2002  Imm Admin: Hep B, Adolescent or Pediatric    03/18/2002  Imm Admin: Hep B, Adolescent or Pediatric    02/13/2002  Imm Admin: Hep B, Adolescent or Pediatric              HEPATITIS C SCREENING  Completed      11/02/2022  Hepatitis C Antibody              Pneumococcal Vaccine 0-64 (Series Information) Completed      02/06/2023  Postponed until 2/6/2024 by Steven Rivas MD (Pending event - Advised.)    02/06/2023  Imm Admin: Pneumococcal Conjugate 20-Valent (PCV20)              Discontinued - URINE MICROALBUMIN  Ordered on 12/23/2024        Frequency changed to Never automatically (Topic No Longer Applies)    09/05/2024  Microalbumin, Urine component of MicroAlbumin, Urine, Random - Urine, Clean Catch    11/02/2023  Microalbumin, Urine component of POC Microalbumin    11/02/2023  Done    11/02/2022  Microalbumin, Urine component of MicroAlbumin, Urine, Random - Urine, Clean Catch    Only the first 5 history entries have been loaded, but more history exists.                    No orders of the defined types were placed in this encounter.      Colorectal Screening:   Deferred.  Last Completed Colonoscopy       This patient has no relevant Health Maintenance data.          CT for Smoker (Age 50-80, 20pk yr within last 15 years): Deferred.  Bone Density/DEXA (high risk): Deferred.  Hep C (Age 18-79 once): Previously ordered.  HIV (Age 15-65 once) : Deferred.  PSA (Over age 50, C Level Recommendation): Deferred.  US Aorta (For male smokers, age 65): Deferred.  A1c:   Hemoglobin A1C   Date Value Ref Range Status   09/05/2024 11.20 (H) 4.80 - 5.60 % Final     Lipid panel: No results  "found for: \"LABLIPI\"    The ASCVD Risk score (Agatha DK, et al., 2019) failed to calculate for the following reasons:    The 2019 ASCVD risk score is only valid for ages 40 to 79    Dermatology: Advised if necessary.  Ophthalmologist: Advised  Dentist: Advised    Tobacco Use: Medium Risk (12/23/2024)    Patient History     Smoking Tobacco Use: Former     Smokeless Tobacco Use: Never     Passive Exposure: Past       Social History     Substance and Sexual Activity   Alcohol Use Not Currently    Comment: no etoh since 2019        Social History     Substance and Sexual Activity   Drug Use Not Currently        Diet/Physical activity: Well-balanced diet/daily exercise.    Sexual health: No issues at present time.    PHQ-2 Depression Screening  PHQ-9 Total Score:  0    Measures:   Advanced Care Planning:   Patient does not have an advance directive, information provided.    Smoking Cessation:   Non-smoker.     Objective     Physical Exam:  Vital Signs:   Vitals:    12/23/24 1325   BP: 130/86   BP Location: Left arm   Patient Position: Sitting   Cuff Size: Adult   Pulse: (!) 126   Resp: 18   Temp: 98 °F (36.7 °C)   TempSrc: Temporal   SpO2: 98%   Weight: 89.4 kg (197 lb)   Height: 185.4 cm (72.99\")     Body mass index is 26 kg/m².     Physical Exam  Vitals and nursing note reviewed.   Constitutional:       Appearance: Normal appearance.   HENT:      Head: Normocephalic and atraumatic.      Nose: Nose normal.      Mouth/Throat:      Pharynx: Oropharynx is clear.   Eyes:      Extraocular Movements: Extraocular movements intact.      Pupils: Pupils are equal, round, and reactive to light.   Neck:      Thyroid: No thyroid mass or thyromegaly.      Trachea: Trachea normal.   Cardiovascular:      Rate and Rhythm: Normal rate and regular rhythm.      Pulses: Normal pulses. No decreased pulses.      Heart sounds: Normal heart sounds.   Pulmonary:      Effort: Pulmonary effort is normal.      Breath sounds: Normal breath sounds. "   Abdominal:      General: Abdomen is flat. Bowel sounds are normal.      Palpations: Abdomen is soft.      Tenderness: There is no abdominal tenderness.   Musculoskeletal:      Cervical back: Neck supple.      Right lower leg: No edema.      Left lower leg: No edema.   Lymphadenopathy:      Cervical: No cervical adenopathy.   Skin:     General: Skin is warm and dry.   Neurological:      General: No focal deficit present.      Mental Status: He is alert and oriented to person, place, and time.      Sensory: Sensation is intact.      Motor: Motor function is intact.      Coordination: Coordination is intact.   Psychiatric:         Attention and Perception: Attention normal.         Mood and Affect: Mood normal.         Speech: Speech normal.         Behavior: Behavior normal.          POCT Results (if applicable):   Results for orders placed or performed in visit on 11/07/24   Uric Acid    Collection Time: 11/07/24  3:37 PM    Specimen: Arm, Right; Blood   Result Value Ref Range    Uric Acid 3.0 (L) 3.4 - 7.0 mg/dL   Sedimentation Rate    Collection Time: 11/07/24  3:37 PM    Specimen: Arm, Right; Blood   Result Value Ref Range    Sed Rate 8 0 - 15 mm/hr   C-reactive Protein    Collection Time: 11/07/24  3:37 PM    Specimen: Arm, Right; Blood   Result Value Ref Range    C-Reactive Protein <0.30 0.00 - 0.50 mg/dL   CBC Auto Differential    Collection Time: 11/07/24  3:37 PM    Specimen: Arm, Right; Blood   Result Value Ref Range    WBC 5.52 3.40 - 10.80 10*3/mm3    RBC 4.40 4.14 - 5.80 10*6/mm3    Hemoglobin 13.7 13.0 - 17.7 g/dL    Hematocrit 40.7 37.5 - 51.0 %    MCV 92.5 79.0 - 97.0 fL    MCH 31.1 26.6 - 33.0 pg    MCHC 33.7 31.5 - 35.7 g/dL    RDW 12.2 (L) 12.3 - 15.4 %    RDW-SD 41.1 37.0 - 54.0 fl    MPV 9.9 6.0 - 12.0 fL    Platelets 275 140 - 450 10*3/mm3   Manual Differential    Collection Time: 11/07/24  3:37 PM    Specimen: Arm, Right; Blood   Result Value Ref Range    Neutrophil % 55.0 42.7 - 76.0 %     Lymphocyte % 37.0 19.6 - 45.3 %    Monocyte % 7.0 5.0 - 12.0 %    Eosinophil % 1.0 0.3 - 6.2 %    Basophil % 0.0 0.0 - 1.5 %    Neutrophils Absolute 3.04 1.70 - 7.00 10*3/mm3    Lymphocytes Absolute 2.04 0.70 - 3.10 10*3/mm3    Monocytes Absolute 0.39 0.10 - 0.90 10*3/mm3    Eosinophils Absolute 0.06 0.00 - 0.40 10*3/mm3    Basophils Absolute 0.00 0.00 - 0.20 10*3/mm3    Anisocytosis Mod/2+ None Seen    Dacrocytes Mod/2+ None Seen    Macrocytes Mod/2+ None Seen    Poikilocytes Mod/2+ None Seen    Spherocytes Slight/1+ None Seen    WBC Morphology Normal Normal    Platelet Morphology Normal Normal       Procedures    Assessment / Plan      Assessment/Plan:   Diagnoses and all orders for this visit:    1. Well adult exam (Primary)  Patient did have a wellness exam performed today that did not reveal any abnormality.  Patient's anxiety/depression scores were reviewed.  We will continue to monitor laboratory data as well as symptomatology and if there are any other questions or concerns prior to the next scheduled follow-up they will contact us.  -     CBC (No Diff); Future  -     Comprehensive Metabolic Panel; Future  -     Hemoglobin A1c; Future  -     Lipid Panel; Future  -     Urinalysis without microscopic (no culture) - Urine, Clean Catch; Future  -     Urine Drug Screen - Urine, Clean Catch; Future  -     MicroAlbumin, Urine, Random - Urine, Clean Catch; Future  -     Vitamin B12; Future  -     TSH Rfx On Abnormal To Free T4; Future    2. Type 2 diabetes mellitus with hyperglycemia, with long-term current use of insulin  Patient does appear to be doing relatively well with respect to their diabetes.  They are tolerating their medications without complaints.  We will continue to follow their hemoglobin A1c and will make adjustments to keep their level less than 7%.  -     Insulin Glargine (BASAGLAR KWIKPEN) 100 UNIT/ML injection pen; Inject 40 Units under the skin into the appropriate area as directed Every  Night.    3. Primary hypertension   Patient appears to be tolerating their blood pressure medicine without any side effects.  We will continue to monitor their blood pressure and will adjust to keep there is systolic blood pressure less than 130.  We will continue to monitor renal function and will make adjustments based on these findings.    4. Mixed hyperlipidemia   Patient does appear to be tolerating their lipid-lowering agent without difficulty.  We will obtain the lipid profile as well as liver function test to observe for any abnormalities.  We will continue to adjust medications to keep their LDL less than 70.    5. Electronic cigarette use    6. Adult ADHD   Patient is doing very well with respect to their ADHD.  They are tolerating their medications well without any side effects.  They have not had any complaints of insomnia, agitation, anxiety, tachycardia excetra.  They understands the risk and benefits of medications and believe that things are doing well and does not wish to make any changes currently.  We will follow-up in 3 months time or sooner if there is any other questions or concerns.    7. Hypokalemia   We will obtain potassium level make adjustments based on these findings.       Healthcare Maintenance:  Counseling provided based on age appropriate USPSTF guidelines.  BMI is >= 25 and <30. (Overweight) The following options were offered after discussion;: exercise counseling/recommendations and nutrition counseling/recommendations    Colten Ogden voices understanding and acceptance of this advice and will call back with any further questions or concerns. AVS with preventive healthcare tips printed for patient.     Follow Up:   Return in about 3 months (around 3/23/2025).    At [facility], we believe that sharing information builds trust and better relationships. You are receiving this note because you recently visited [facility]. It is possible you will see health information before  a provider has talked with you about it. This kind of information can be easy to misunderstand. To help you fully understand what it means for your health, we urge you to discuss this note with your provider.    Steven Rivas MD  [facility]

## 2024-12-27 DIAGNOSIS — F90.9 ADULT ADHD: ICD-10-CM

## 2024-12-27 NOTE — TELEPHONE ENCOUNTER
Caller: Colten Ogden    Relationship: Self    Best call back number: [phone]     Requested Prescriptions:   Requested Prescriptions     Pending Prescriptions Disp Refills    amphetamine-dextroamphetamine (ADDERALL) 30 MG tablet 30 tablet 0        Pharmacy where request should be sent: [place] [id number] [address] - [phone]  - [phone] FX     Last office visit with prescribing clinician: 12/23/2024   Last telemedicine visit with prescribing clinician: Visit date not found   Next office visit with prescribing clinician: 3/24/2025     Additional details provided by patient: PATIENT IS OUT ON THE 12/30/24

## 2024-12-30 RX ORDER — DEXTROAMPHETAMINE SACCHARATE, AMPHETAMINE ASPARTATE, DEXTROAMPHETAMINE SULFATE AND AMPHETAMINE SULFATE 7.5; 7.5; 7.5; 7.5 MG/1; MG/1; MG/1; MG/1
30 TABLET ORAL DAILY
Qty: 30 TABLET | Refills: 0 | Status: SHIPPED | OUTPATIENT
Start: 2024-12-30

## 2025-01-20 DIAGNOSIS — E11.65 TYPE 2 DIABETES MELLITUS WITH HYPERGLYCEMIA, WITHOUT LONG-TERM CURRENT USE OF INSULIN: ICD-10-CM

## 2025-01-20 RX ORDER — PIOGLITAZONE 45 MG/1
45 TABLET ORAL DAILY
Qty: 90 TABLET | Refills: 3 | Status: SHIPPED | OUTPATIENT
Start: 2025-01-20

## 2025-01-29 DIAGNOSIS — F90.9 ADULT ADHD: ICD-10-CM

## 2025-01-29 RX ORDER — DEXTROAMPHETAMINE SACCHARATE, AMPHETAMINE ASPARTATE, DEXTROAMPHETAMINE SULFATE AND AMPHETAMINE SULFATE 7.5; 7.5; 7.5; 7.5 MG/1; MG/1; MG/1; MG/1
30 TABLET ORAL DAILY
Qty: 30 TABLET | Refills: 0 | OUTPATIENT
Start: 2025-01-29

## 2025-01-29 NOTE — TELEPHONE ENCOUNTER
Caller: Colten Ogden    Relationship: Self    Best call back number: [phone]     Requested Prescriptions:   Requested Prescriptions     Pending Prescriptions Disp Refills    amphetamine-dextroamphetamine (ADDERALL) 30 MG tablet 30 tablet 0     Sig: Take 1 tablet by mouth Daily.        Pharmacy where request should be sent: [place] [id number] [address] - [phone]  - [phone] FX     Last office visit with prescribing clinician: 12/23/2024   Last telemedicine visit with prescribing clinician: Visit date not found   Next office visit with prescribing clinician: 3/24/2025     Additional details provided by patient: OUT OF MEDS

## 2025-01-30 DIAGNOSIS — F90.9 ADULT ADHD: ICD-10-CM

## 2025-01-30 RX ORDER — DEXTROAMPHETAMINE SACCHARATE, AMPHETAMINE ASPARTATE, DEXTROAMPHETAMINE SULFATE AND AMPHETAMINE SULFATE 7.5; 7.5; 7.5; 7.5 MG/1; MG/1; MG/1; MG/1
1 TABLET ORAL DAILY
Qty: 30 TABLET | Refills: 0 | OUTPATIENT
Start: 2025-01-30

## 2025-02-04 ENCOUNTER — LAB (OUTPATIENT)
Dept: FAMILY MEDICINE CLINIC | Facility: CLINIC | Age: 40
End: 2025-02-04
Payer: COMMERCIAL

## 2025-02-04 DIAGNOSIS — Z00.00 WELL ADULT EXAM: ICD-10-CM

## 2025-02-04 LAB
ALBUMIN SERPL-MCNC: 4.6 G/DL (ref 3.5–5.2)
ALBUMIN UR-MCNC: 3 MG/DL
ALBUMIN/GLOB SERPL: 1.5 G/DL
ALP SERPL-CCNC: 82 U/L (ref 39–117)
ALT SERPL W P-5'-P-CCNC: 26 U/L (ref 1–41)
AMPHET+METHAMPHET UR QL: POSITIVE
AMPHETAMINES UR QL: POSITIVE
ANION GAP SERPL CALCULATED.3IONS-SCNC: 13 MMOL/L (ref 5–15)
AST SERPL-CCNC: 18 U/L (ref 1–40)
BARBITURATES UR QL SCN: NEGATIVE
BENZODIAZ UR QL SCN: POSITIVE
BILIRUB SERPL-MCNC: 0.4 MG/DL (ref 0–1.2)
BUN SERPL-MCNC: 18 MG/DL (ref 6–20)
BUN/CREAT SERPL: 15.7 (ref 7–25)
BUPRENORPHINE SERPL-MCNC: NEGATIVE NG/ML
CALCIUM SPEC-SCNC: 10.5 MG/DL (ref 8.6–10.5)
CANNABINOIDS SERPL QL: POSITIVE
CHLORIDE SERPL-SCNC: 101 MMOL/L (ref 98–107)
CHOLEST SERPL-MCNC: 107 MG/DL (ref 0–200)
CO2 SERPL-SCNC: 27 MMOL/L (ref 22–29)
COCAINE UR QL: NEGATIVE
CREAT SERPL-MCNC: 1.15 MG/DL (ref 0.76–1.27)
DEPRECATED RDW RBC AUTO: 42.7 FL (ref 37–54)
EGFRCR SERPLBLD CKD-EPI 2021: 83.5 ML/MIN/1.73
ERYTHROCYTE [DISTWIDTH] IN BLOOD BY AUTOMATED COUNT: 12.6 % (ref 12.3–15.4)
FENTANYL UR-MCNC: NEGATIVE NG/ML
GLOBULIN UR ELPH-MCNC: 3.1 GM/DL
GLUCOSE SERPL-MCNC: 188 MG/DL (ref 65–99)
HBA1C MFR BLD: 10.3 % (ref 4.8–5.6)
HCT VFR BLD AUTO: 45 % (ref 37.5–51)
HDLC SERPL-MCNC: 52 MG/DL (ref 40–60)
HGB BLD-MCNC: 14.8 G/DL (ref 13–17.7)
LDLC SERPL CALC-MCNC: 38 MG/DL (ref 0–100)
LDLC/HDLC SERPL: 0.72 {RATIO}
MCH RBC QN AUTO: 30.8 PG (ref 26.6–33)
MCHC RBC AUTO-ENTMCNC: 32.9 G/DL (ref 31.5–35.7)
MCV RBC AUTO: 93.8 FL (ref 79–97)
METHADONE UR QL SCN: NEGATIVE
OPIATES UR QL: NEGATIVE
OXYCODONE UR QL SCN: NEGATIVE
PCP UR QL SCN: NEGATIVE
PLATELET # BLD AUTO: 351 10*3/MM3 (ref 140–450)
PMV BLD AUTO: 9.4 FL (ref 6–12)
POTASSIUM SERPL-SCNC: 3.7 MMOL/L (ref 3.5–5.2)
PROT SERPL-MCNC: 7.7 G/DL (ref 6–8.5)
RBC # BLD AUTO: 4.8 10*6/MM3 (ref 4.14–5.8)
SODIUM SERPL-SCNC: 141 MMOL/L (ref 136–145)
TRICYCLICS UR QL SCN: NEGATIVE
TRIGL SERPL-MCNC: 87 MG/DL (ref 0–150)
TSH SERPL DL<=0.05 MIU/L-ACNC: 1.74 UIU/ML (ref 0.27–4.2)
VIT B12 BLD-MCNC: 420 PG/ML (ref 211–946)
VLDLC SERPL-MCNC: 17 MG/DL (ref 5–40)
WBC NRBC COR # BLD AUTO: 7.04 10*3/MM3 (ref 3.4–10.8)

## 2025-02-04 PROCEDURE — 81003 URINALYSIS AUTO W/O SCOPE: CPT | Performed by: FAMILY MEDICINE

## 2025-02-04 PROCEDURE — 36415 COLL VENOUS BLD VENIPUNCTURE: CPT | Performed by: FAMILY MEDICINE

## 2025-02-04 PROCEDURE — 80050 GENERAL HEALTH PANEL: CPT | Performed by: FAMILY MEDICINE

## 2025-02-04 PROCEDURE — 80061 LIPID PANEL: CPT | Performed by: FAMILY MEDICINE

## 2025-02-04 PROCEDURE — 82607 VITAMIN B-12: CPT | Performed by: FAMILY MEDICINE

## 2025-02-04 PROCEDURE — 83036 HEMOGLOBIN GLYCOSYLATED A1C: CPT | Performed by: FAMILY MEDICINE

## 2025-02-04 PROCEDURE — 80307 DRUG TEST PRSMV CHEM ANLYZR: CPT | Performed by: FAMILY MEDICINE

## 2025-02-04 PROCEDURE — 82043 UR ALBUMIN QUANTITATIVE: CPT | Performed by: FAMILY MEDICINE

## 2025-02-05 DIAGNOSIS — F90.9 ADULT ADHD: ICD-10-CM

## 2025-02-05 LAB
BILIRUB UR QL STRIP: NEGATIVE
CLARITY UR: CLEAR
COLOR UR: YELLOW
GLUCOSE UR STRIP-MCNC: ABNORMAL MG/DL
HGB UR QL STRIP.AUTO: NEGATIVE
KETONES UR QL STRIP: ABNORMAL
LEUKOCYTE ESTERASE UR QL STRIP.AUTO: NEGATIVE
NITRITE UR QL STRIP: NEGATIVE
PH UR STRIP.AUTO: 5.5 [PH] (ref 5–8)
PROT UR QL STRIP: ABNORMAL
SP GR UR STRIP: 1.02 (ref 1–1.03)
UROBILINOGEN UR QL STRIP: ABNORMAL

## 2025-02-05 RX ORDER — DEXTROAMPHETAMINE SACCHARATE, AMPHETAMINE ASPARTATE, DEXTROAMPHETAMINE SULFATE AND AMPHETAMINE SULFATE 7.5; 7.5; 7.5; 7.5 MG/1; MG/1; MG/1; MG/1
30 TABLET ORAL DAILY
Qty: 30 TABLET | Refills: 0 | OUTPATIENT
Start: 2025-02-05

## 2025-02-05 RX ORDER — DEXTROAMPHETAMINE SACCHARATE, AMPHETAMINE ASPARTATE, DEXTROAMPHETAMINE SULFATE AND AMPHETAMINE SULFATE 7.5; 7.5; 7.5; 7.5 MG/1; MG/1; MG/1; MG/1
1 TABLET ORAL DAILY
Qty: 30 TABLET | Refills: 0 | OUTPATIENT
Start: 2025-02-05

## 2025-02-05 NOTE — TELEPHONE ENCOUNTER
Caller: Colten Ogden    Relationship: Self    Best call back number: [phone]     Requested Prescriptions:   Requested Prescriptions     Pending Prescriptions Disp Refills    amphetamine-dextroamphetamine (ADDERALL) 30 MG tablet 30 tablet 0     Sig: Take 1 tablet by mouth Daily.        Pharmacy where request should be sent: [place] [id number] - [phone]  - [phone] FX     Last office visit with prescribing clinician: 12/23/2024   Last telemedicine visit with prescribing clinician: Visit date not found   Next office visit with prescribing clinician: 3/24/2025     Additional details provided by patient: PATIENT IS COMPLETELY OUT FOR ABOUT A WEEK    Does the patient have less than a 3 day supply:  [x] Yes  [] No    Sammy Guidry Rep   02/05/25 09:51 EST

## 2025-02-19 DIAGNOSIS — E78.2 MIXED HYPERLIPIDEMIA: ICD-10-CM

## 2025-02-19 RX ORDER — EZETIMIBE 10 MG/1
10 TABLET ORAL DAILY
Qty: 90 TABLET | Refills: 3 | Status: SHIPPED | OUTPATIENT
Start: 2025-02-19

## 2025-03-27 ENCOUNTER — OFFICE VISIT (OUTPATIENT)
Dept: FAMILY MEDICINE CLINIC | Facility: CLINIC | Age: 40
End: 2025-03-27
Payer: COMMERCIAL

## 2025-03-27 VITALS
DIASTOLIC BLOOD PRESSURE: 80 MMHG | SYSTOLIC BLOOD PRESSURE: 140 MMHG | HEIGHT: 73 IN | OXYGEN SATURATION: 97 % | HEART RATE: 145 BPM | BODY MASS INDEX: 27.06 KG/M2 | TEMPERATURE: 97.8 F | RESPIRATION RATE: 18 BRPM | WEIGHT: 204.2 LBS

## 2025-03-27 DIAGNOSIS — E11.65 TYPE 2 DIABETES MELLITUS WITH HYPERGLYCEMIA, WITH LONG-TERM CURRENT USE OF INSULIN: Primary | ICD-10-CM

## 2025-03-27 DIAGNOSIS — E78.2 MIXED HYPERLIPIDEMIA: ICD-10-CM

## 2025-03-27 DIAGNOSIS — M67.472 GANGLION CYST OF BOTH FEET: ICD-10-CM

## 2025-03-27 DIAGNOSIS — Z78.9 ELECTRONIC CIGARETTE USE: ICD-10-CM

## 2025-03-27 DIAGNOSIS — Z79.4 TYPE 2 DIABETES MELLITUS WITH HYPERGLYCEMIA, WITH LONG-TERM CURRENT USE OF INSULIN: ICD-10-CM

## 2025-03-27 DIAGNOSIS — M67.471 GANGLION CYST OF BOTH FEET: ICD-10-CM

## 2025-03-27 DIAGNOSIS — Z28.21 IMMUNIZATION DECLINED: ICD-10-CM

## 2025-03-27 DIAGNOSIS — E11.65 TYPE 2 DIABETES MELLITUS WITH HYPERGLYCEMIA, WITH LONG-TERM CURRENT USE OF INSULIN: ICD-10-CM

## 2025-03-27 DIAGNOSIS — I10 PRIMARY HYPERTENSION: ICD-10-CM

## 2025-03-27 DIAGNOSIS — Z79.4 TYPE 2 DIABETES MELLITUS WITH HYPERGLYCEMIA, WITH LONG-TERM CURRENT USE OF INSULIN: Primary | ICD-10-CM

## 2025-03-27 DIAGNOSIS — F90.9 ADULT ADHD: ICD-10-CM

## 2025-03-27 DIAGNOSIS — E11.9 ENCOUNTER FOR DIABETIC FOOT EXAM: ICD-10-CM

## 2025-03-27 RX ORDER — ACYCLOVIR 400 MG/1
1 TABLET ORAL
Qty: 3 EACH | Refills: 11 | Status: SHIPPED | OUTPATIENT
Start: 2025-03-27

## 2025-03-27 RX ORDER — INSULIN GLARGINE 100 [IU]/ML
40 INJECTION, SOLUTION SUBCUTANEOUS NIGHTLY
Start: 2025-03-27 | End: 2025-03-31 | Stop reason: SDUPTHER

## 2025-03-27 RX ORDER — ACYCLOVIR 400 MG/1
1 TABLET ORAL DAILY
Qty: 1 EACH | Refills: 11 | Status: SHIPPED | OUTPATIENT
Start: 2025-03-27

## 2025-03-27 NOTE — PROGRESS NOTES
Follow Up Office Visit      Date: 2025   Patient Name: Colten Ogden  : 1985   MRN: [id number]     Chief Complaint:    Chief Complaint   Patient presents with    Follow-up     3 month    Diabetes    Hypertension    Hyperlipidemia    ADHD       History of Present Illness: Colten Ogden is a 39 y.o. male who is here today for follow-up.    History of Present Illness  The patient is a 39-year-old male who presents for evaluation of diabetes, hypertension, and bilateral foot ganglion cysts.    He reports an improvement in his blood glucose levels, which he monitors 2 to 3 times daily. He has been unable to use the reader due to technical issues with his phone. His highest recorded blood glucose level in the past 3 months was 305. He experiences frequent urination, sometimes to the point of incontinence. He has been adhering to his medication regimen, including insulin administration at night, although with occasional lapses. He experienced an episode of lightheadedness and dizziness approximately 1 week ago, during which his blood glucose level was 200. He continues to take his antihypertensive medication. He recently had an ophthalmological examination on Tuesday.    He has a ganglion cyst on his foot, which causes discomfort during ambulation. The cyst has been present for approximately 1 year and has been progressively enlarging. He also reports the presence of a callus on his foot.    He continues to take his antihypertensive medication. He experienced an episode of lightheadedness and dizziness approximately 1 week ago, during which his blood glucose level was 200. Despite this, his blood pressure remained within normal limits.    FAMILY HISTORY  His daughter has diabetes.    MEDICATIONS  Current: Insulin, Jardiance     Patient appears to be doing otherwise well.  They have continue with their medications without any side effects.  They have not had any changes in their usual activity,  appetite and sleep.  Patient denies any other cardiovascular, respiratory, gastrointestinal, urologic or neurologic complaints.    Subjective      Review of Systems:   Review of Systems   Constitutional:  Negative for activity change, appetite change and fatigue.   Respiratory:  Negative for cough, chest tightness, shortness of breath and wheezing.    Cardiovascular:  Negative for chest pain, palpitations and leg swelling.   Gastrointestinal:  Negative for abdominal distention, abdominal pain, blood in stool, constipation, diarrhea, nausea, vomiting, GERD and indigestion.   Genitourinary:  Negative for difficulty urinating, dysuria, flank pain, frequency, hematuria and urgency.   Musculoskeletal:  Negative for arthralgias, back pain, gait problem, joint swelling and myalgias.   Neurological:  Negative for dizziness, tremors, seizures, syncope, weakness, light-headedness, numbness, headache and memory problem.   Psychiatric/Behavioral:  Negative for sleep disturbance and depressed mood. The patient is not nervous/anxious.        I have reviewed the patients family history, social history, past medical history, past surgical history and have updated it as appropriate.     Medications:     Current Outpatient Medications:     atorvastatin (LIPITOR) 80 MG tablet, TAKE ONE TABLET BY MOUTH EVERY DAY FOR CHOLESTEROL, Disp: 30 tablet, Rfl: 12    BD Pen Needle Rosario U/F 32G X 4 MM misc, USE 1 DAILY., Disp: 30 each, Rfl: 12    Blood Glucose Monitoring Suppl kit, 1 each 3 (Three) Times a Day., Disp: 1 each, Rfl: 0    Diclofenac Sodium (VOLTAREN) 1 % gel gel, Apply 4 g topically to the appropriate area as directed 4 (Four) Times a Day., Disp: 350 g, Rfl: 11    ezetimibe (ZETIA) 10 MG tablet, TAKE ONE TABLET BY MOUTH EVERY DAY, Disp: 90 tablet, Rfl: 3    Glucose Blood (Blood Glucose Test) strip, 1 each by In Vitro route 3 (Three) Times a Day., Disp: 100 each, Rfl: 11    Insulin Glargine (BASAGLAR KWIKPEN) 100 UNIT/ML injection  "pen, Inject 40 Units under the skin into the appropriate area as directed Every Night., Disp: , Rfl:     Lancets (OneTouch Delica Plus Lunyft99H) misc, 1 each by Other route Daily., Disp: , Rfl:     lisinopril (PRINIVIL,ZESTRIL) 10 MG tablet, TAKE 1 TABLET BY MOUTH DAILY., Disp: 90 tablet, Rfl: 3    metFORMIN (GLUCOPHAGE) 1000 MG tablet, TAKE ONE TABLET BY MOUTH TWO TIMES A DAY WITH MEALS., Disp: 60 tablet, Rfl: 12    pioglitazone (ACTOS) 45 MG tablet, TAKE 1 TABLET BY MOUTH DAILY., Disp: 90 tablet, Rfl: 3    Continuous Glucose  (Dexcom G7 ) device, Use 1 each Daily., Disp: 1 each, Rfl: 11    Continuous Glucose Sensor (Dexcom G7 Sensor) misc, Use 1 each Every 10 (Ten) Days., Disp: 3 each, Rfl: 11    Jardiance 25 MG tablet tablet, Take 1 tablet by mouth Daily., Disp: 90 tablet, Rfl: 3    Allergies:   No Known Allergies    Immunizations:   Immunization History   Administered Date(s) Administered    COVID-19 (MODERNA) 1st,2nd,3rd Dose Monovalent 03/24/2021, 04/21/2021    COVID-19 (UNSPECIFIED) 03/24/2021, 04/21/2021    Fluzone (or Fluarix & Flulaval for VFC) >6mos 09/14/2018, 11/02/2022    Hep B, Adolescent or Pediatric 02/13/2002, 03/18/2002, 08/13/2002    Influenza Seasonal Injectable 09/25/2009    Influenza, Unspecified 09/25/2009, 11/02/2022    Pneumococcal Conjugate 20-Valent (PCV20) 02/06/2023    Td (TDVAX) 08/13/2002    Tdap 02/06/2023        Objective     Physical Exam: Please see above  Vital Signs:   Vitals:    03/27/25 1631   BP: 140/80   BP Location: Left arm   Patient Position: Sitting   Cuff Size: Adult   Pulse: (!) 145   Resp: 18   Temp: 97.8 °F (36.6 °C)   TempSrc: Temporal   SpO2: 97%   Weight: 92.6 kg (204 lb 3.2 oz)   Height: 185.4 cm (73\")     Body mass index is 26.94 kg/m².          Physical Exam  Vitals and nursing note reviewed.   Constitutional:       Appearance: Normal appearance.   HENT:      Head: Normocephalic and atraumatic.      Nose: Nose normal.      Mouth/Throat:      " Pharynx: Oropharynx is clear.   Eyes:      Extraocular Movements: Extraocular movements intact.      Pupils: Pupils are equal, round, and reactive to light.   Neck:      Thyroid: No thyroid mass or thyromegaly.      Trachea: Trachea normal.   Cardiovascular:      Rate and Rhythm: Normal rate and regular rhythm.      Pulses: Normal pulses. No decreased pulses.           Dorsalis pedis pulses are 2+ on the right side and 2+ on the left side.        Posterior tibial pulses are 2+ on the right side and 2+ on the left side.      Heart sounds: Normal heart sounds.   Pulmonary:      Effort: Pulmonary effort is normal.      Breath sounds: Normal breath sounds.   Abdominal:      General: Abdomen is flat. Bowel sounds are normal.      Palpations: Abdomen is soft.      Tenderness: There is no abdominal tenderness.   Musculoskeletal:      Cervical back: Neck supple.      Right lower leg: No edema.      Left lower leg: No edema.      Right foot: No deformity.      Left foot: No deformity.   Feet:      Right foot:      Protective Sensation: 5 sites tested.  5 sites sensed.      Skin integrity: Skin integrity normal. Callus and dry skin present. No ulcer, skin breakdown or erythema.      Toenail Condition: Right toenails are normal.      Left foot:      Protective Sensation: 3 sites tested.  3 sites sensed.      Skin integrity: Skin integrity normal. Callus and dry skin present. No ulcer, skin breakdown or erythema.      Toenail Condition: Left toenails are normal.      Comments: Ganglion cysts of both feet  Lymphadenopathy:      Cervical: No cervical adenopathy.   Skin:     General: Skin is warm and dry.   Neurological:      General: No focal deficit present.      Mental Status: He is alert and oriented to person, place, and time.      Sensory: Sensation is intact.      Motor: Motor function is intact.      Coordination: Coordination is intact.   Psychiatric:         Attention and Perception: Attention normal.         Mood and  Affect: Mood normal.         Speech: Speech normal.         Behavior: Behavior normal.         Procedures    Results:   Labs:   Hemoglobin A1C   Date Value Ref Range Status   02/04/2025 10.30 (H) 4.80 - 5.60 % Final     TSH   Date Value Ref Range Status   02/04/2025 1.740 0.270 - 4.200 uIU/mL Final        POCT Results (if applicable):   Results for orders placed or performed in visit on 02/04/25   CBC (No Diff)    Collection Time: 02/04/25  9:28 AM    Specimen: Arm, Right; Blood   Result Value Ref Range    WBC 7.04 3.40 - 10.80 10*3/mm3    RBC 4.80 4.14 - 5.80 10*6/mm3    Hemoglobin 14.8 13.0 - 17.7 g/dL    Hematocrit 45.0 37.5 - 51.0 %    MCV 93.8 79.0 - 97.0 fL    MCH 30.8 26.6 - 33.0 pg    MCHC 32.9 31.5 - 35.7 g/dL    RDW 12.6 12.3 - 15.4 %    RDW-SD 42.7 37.0 - 54.0 fl    MPV 9.4 6.0 - 12.0 fL    Platelets 351 140 - 450 10*3/mm3   Comprehensive Metabolic Panel    Collection Time: 02/04/25  9:28 AM    Specimen: Arm, Right; Blood   Result Value Ref Range    Glucose 188 (H) 65 - 99 mg/dL    BUN 18 6 - 20 mg/dL    Creatinine 1.15 0.76 - 1.27 mg/dL    Sodium 141 136 - 145 mmol/L    Potassium 3.7 3.5 - 5.2 mmol/L    Chloride 101 98 - 107 mmol/L    CO2 27.0 22.0 - 29.0 mmol/L    Calcium 10.5 8.6 - 10.5 mg/dL    Total Protein 7.7 6.0 - 8.5 g/dL    Albumin 4.6 3.5 - 5.2 g/dL    ALT (SGPT) 26 1 - 41 U/L    AST (SGOT) 18 1 - 40 U/L    Alkaline Phosphatase 82 39 - 117 U/L    Total Bilirubin 0.4 0.0 - 1.2 mg/dL    Globulin 3.1 gm/dL    A/G Ratio 1.5 g/dL    BUN/Creatinine Ratio 15.7 7.0 - 25.0    Anion Gap 13.0 5.0 - 15.0 mmol/L    eGFR 83.5 >60.0 mL/min/1.73   Hemoglobin A1c    Collection Time: 02/04/25  9:28 AM    Specimen: Arm, Right; Blood   Result Value Ref Range    Hemoglobin A1C 10.30 (H) 4.80 - 5.60 %   Lipid Panel    Collection Time: 02/04/25  9:28 AM    Specimen: Arm, Right; Blood   Result Value Ref Range    Total Cholesterol 107 0 - 200 mg/dL    Triglycerides 87 0 - 150 mg/dL    HDL Cholesterol 52 40 - 60 mg/dL     LDL Cholesterol  38 0 - 100 mg/dL    VLDL Cholesterol 17 5 - 40 mg/dL    LDL/HDL Ratio 0.72    Vitamin B12    Collection Time: 02/04/25  9:28 AM    Specimen: Arm, Right; Blood   Result Value Ref Range    Vitamin B-12 420 211 - 946 pg/mL   TSH Rfx On Abnormal To Free T4    Collection Time: 02/04/25  9:28 AM    Specimen: Arm, Right; Blood   Result Value Ref Range    TSH 1.740 0.270 - 4.200 uIU/mL   Urinalysis without microscopic (no culture) - Urine, Clean Catch    Collection Time: 02/04/25  9:28 AM    Specimen: Urine, Clean Catch   Result Value Ref Range    Color, UA Yellow Yellow, Straw    Appearance, UA Clear Clear    pH, UA 5.5 5.0 - 8.0    Specific Gravity, UA 1.025 1.005 - 1.030    Glucose, UA >=1000 mg/dL (3+) (A) Negative    Ketones, UA Trace (A) Negative    Bilirubin, UA Negative Negative    Blood, UA Negative Negative    Protein, UA Trace (A) Negative    Leuk Esterase, UA Negative Negative    Nitrite, UA Negative Negative    Urobilinogen, UA 1.0 E.U./dL 0.2 - 1.0 E.U./dL   Urine Drug Screen - Urine, Clean Catch    Collection Time: 02/04/25  9:28 AM    Specimen: Urine, Clean Catch   Result Value Ref Range    THC, Screen, Urine Positive (A) Negative    Phencyclidine (PCP), Urine Negative Negative    Cocaine Screen, Urine Negative Negative    Methamphetamine, Ur Positive (A) Negative    Opiate Screen Negative Negative    Amphetamine Screen, Urine Positive (A) Negative    Benzodiazepine Screen, Urine Positive (A) Negative    Tricyclic Antidepressants Screen Negative Negative    Methadone Screen, Urine Negative Negative    Barbiturates Screen, Urine Negative Negative    Oxycodone Screen, Urine Negative Negative    Buprenorphine, Screen, Urine Negative Negative   MicroAlbumin, Urine, Random - Urine, Clean Catch    Collection Time: 02/04/25  9:28 AM    Specimen: Urine, Clean Catch   Result Value Ref Range    Microalbumin, Urine 3.0 mg/dL   Fentanyl, Urine - Urine, Clean Catch    Collection Time: 02/04/25  9:28  AM    Specimen: Urine, Clean Catch   Result Value Ref Range    Fentanyl, Urine Negative Negative       Imaging:   No valid procedures specified.     Measures:   Advanced Care Planning:   Patient does not have an advance directive, information provided.    Smoking Cessation:   less than 3 minutes spent counseling Will try to cut down    Assessment / Plan      Assessment/Plan:   Diagnoses and all orders for this visit:    1. Type 2 diabetes mellitus with hyperglycemia, with long-term current use of insulin (Primary)  Patient does appear to be doing relatively well with respect to their diabetes.  They are tolerating their medications without complaints.  We will continue to follow their hemoglobin A1c and will make adjustments to keep their level less than 7%.  Patient has not been monitoring his blood sugars as of yet as he does not have a  for his glucose monitor.  We have given him a G7  and will hopefully be able to get the G7 sensor at the pharmacist.  He has been encouraged to monitor his blood sugars and to make adjustments as necessary.  He does appear to be taking his medications most days out of the week.  He still averages 4 days a week of the medication.  We will continue to encourage compliance and will continue to treat accordingly.  -     Continuous Glucose  (Dexcom G7 ) device; Use 1 each Daily.  Dispense: 1 each; Refill: 11  -     Continuous Glucose Sensor (Dexcom G7 Sensor) misc; Use 1 each Every 10 (Ten) Days.  Dispense: 3 each; Refill: 11    2. Mixed hyperlipidemia   Patient does appear to be tolerating their lipid-lowering agent without difficulty.  We will obtain the lipid profile as well as liver function test to observe for any abnormalities.  We will continue to adjust medications to keep their LDL less than 70.    3. Primary hypertension   Patient appears to be tolerating their blood pressure medicine without any side effects.  We will continue to monitor their  blood pressure and will adjust to keep there is systolic blood pressure less than 130.  We will continue to monitor renal function and will make adjustments based on these findings.    4. Electronic cigarette use    5. Adult ADHD   Patient still has some issues with ADHD but unfortunately did have some abnormal laboratory data.  We have discussed that we will continue to consider reinitiation of the stimulants once we have received urine analysis that did not reveal any abnormality.  We will obtain a urinalysis when he returns to clinic and may consider initiation of medication if he does not have any illegal substances.    6. Encounter for diabetic foot exam   Patient does appear to have some difficulty with his feet.  He does have some dryness as well as what appears to be some callus formation.  He is also having some ganglion cyst on the bottom of his feet that is causing some tenderness.  We will refer him to a foot specialist and will have him continue to monitor his feet and if he has other problems complaints further assessment treatment will be necessary.    7. Immunization declined   Patient would benefit from having immunizations given.  Patient has elected not to receive the recommended vaccines at present time.  They understand the risk of not receiving these vaccine and the disease in which they may incur.  We have discussed other options and will pursue with encouragement of compliance with future appointments.  If patient does change their minds prior to the next scheduled follow-up, they may contact us and we will provide immunization at that time.    8. Ganglion cyst of both feet  -     Ambulatory Referral to Orthopedic Surgery        Follow Up:   Return in about 5 weeks (around 5/4/2025).      At [facility], we believe that sharing information builds trust and better relationships. You are receiving this note because you recently visited [facility]. It is possible you will see [facility]  information before a provider has talked with you about it. This kind of information can be easy to misunderstand. To help you fully understand what it means for your health, we urge you to discuss this note with your provider.    Steven Rivas MD  [facility]    Patient or patient representative verbalized consent for the use of Ambient Listening during the visit with  Steven Rivas MD for chart documentation. 3/27/2025  16:44 EDT

## 2025-03-27 NOTE — TELEPHONE ENCOUNTER
Caller: ELIEZER TIPTON    Relationship: Mother    Best call back number: [phone]     Requested Prescriptions:   Requested Prescriptions     Pending Prescriptions Disp Refills    Insulin Glargine (BASAGLAR KWIKPEN) 100 UNIT/ML injection pen       Sig: Inject 40 Units under the skin into the appropriate area as directed Every Night.        Pharmacy where request should be sent: [place] [id number] [facility] - [phone]  - [phone] FX     Last office visit with prescribing clinician: 12/23/2024   Last telemedicine visit with prescribing clinician: Visit date not found   Next office visit with prescribing clinician: 3/27/2025     Additional details provided by patient: NEEDING THE PRIOR AUTH.

## 2025-03-28 ENCOUNTER — TELEPHONE (OUTPATIENT)
Dept: FAMILY MEDICINE CLINIC | Facility: CLINIC | Age: 40
End: 2025-03-28
Payer: COMMERCIAL

## 2025-03-28 NOTE — TELEPHONE ENCOUNTER
Caller: ELIEZER TIPTON    Relationship to patient:       Best call back number: [phone]    Provider: DR SIFUENTES    Medication PA needed: Insulin Glargine (BASAGLAR KWIKPEN) 100 UNIT/ML injection pen     Reason for call/Prior Auth: INSURANCE

## 2025-03-31 DIAGNOSIS — Z79.4 TYPE 2 DIABETES MELLITUS WITH HYPERGLYCEMIA, WITH LONG-TERM CURRENT USE OF INSULIN: ICD-10-CM

## 2025-03-31 DIAGNOSIS — E11.65 TYPE 2 DIABETES MELLITUS WITH HYPERGLYCEMIA, WITH LONG-TERM CURRENT USE OF INSULIN: ICD-10-CM

## 2025-03-31 RX ORDER — INSULIN GLARGINE 100 [IU]/ML
40 INJECTION, SOLUTION SUBCUTANEOUS NIGHTLY
Qty: 15 ML | Refills: 6
Start: 2025-03-31 | End: 2025-04-01 | Stop reason: CLARIF

## 2025-03-31 NOTE — TELEPHONE ENCOUNTER
ELIEZER - MOTHER  CALLED CHECKING ON STATUS   SAID THIS IS THE THIRD TIME SHE HAS CALLED  HE HAS ONE TUBE LEFT

## 2025-04-01 ENCOUNTER — TELEPHONE (OUTPATIENT)
Dept: FAMILY MEDICINE CLINIC | Facility: CLINIC | Age: 40
End: 2025-04-01
Payer: COMMERCIAL

## 2025-04-01 DIAGNOSIS — Z79.4 TYPE 2 DIABETES MELLITUS WITH HYPERGLYCEMIA, WITH LONG-TERM CURRENT USE OF INSULIN: ICD-10-CM

## 2025-04-01 DIAGNOSIS — E11.65 TYPE 2 DIABETES MELLITUS WITH HYPERGLYCEMIA, WITH LONG-TERM CURRENT USE OF INSULIN: ICD-10-CM

## 2025-04-01 RX ORDER — ACYCLOVIR 400 MG/1
1 TABLET ORAL
Qty: 3 EACH | Refills: 11 | Status: SHIPPED | OUTPATIENT
Start: 2025-04-01

## 2025-04-01 NOTE — TELEPHONE ENCOUNTER
Caller: ELIEZER TIPTON    Relationship: Mother    Best call back number: [phone]     What was the call regarding: STATED THAT THE PATIENTS INSURANCE IS NOT GOING TO COVER THE INSULIN PLEASE CALL AND DISCUSS ALTERNATIVES. PATIENT HAS A COUPLE DAYS REMAINING ( 1 PEN ALREADY USING)   DEXCOM READER WAS ALSO SENT BUT THERE ARE NO SENSORS. PLEASE SEND THOSE WERENT SENT IN ORIGINAL ORDER

## 2025-04-14 DIAGNOSIS — I10 PRIMARY HYPERTENSION: ICD-10-CM

## 2025-04-14 RX ORDER — LISINOPRIL 10 MG/1
10 TABLET ORAL DAILY
Qty: 30 TABLET | Refills: 3 | Status: SHIPPED | OUTPATIENT
Start: 2025-04-14

## 2025-05-05 ENCOUNTER — LAB (OUTPATIENT)
Dept: FAMILY MEDICINE CLINIC | Facility: CLINIC | Age: 40
End: 2025-05-05
Payer: COMMERCIAL

## 2025-05-05 ENCOUNTER — OFFICE VISIT (OUTPATIENT)
Dept: FAMILY MEDICINE CLINIC | Facility: CLINIC | Age: 40
End: 2025-05-05
Payer: COMMERCIAL

## 2025-05-05 VITALS
TEMPERATURE: 98 F | BODY MASS INDEX: 27.17 KG/M2 | HEIGHT: 73 IN | WEIGHT: 205 LBS | DIASTOLIC BLOOD PRESSURE: 70 MMHG | HEART RATE: 104 BPM | RESPIRATION RATE: 18 BRPM | SYSTOLIC BLOOD PRESSURE: 110 MMHG | OXYGEN SATURATION: 97 %

## 2025-05-05 DIAGNOSIS — Z79.4 TYPE 2 DIABETES MELLITUS WITH HYPERGLYCEMIA, WITH LONG-TERM CURRENT USE OF INSULIN: ICD-10-CM

## 2025-05-05 DIAGNOSIS — F90.9 ADULT ADHD: ICD-10-CM

## 2025-05-05 DIAGNOSIS — Z28.21 IMMUNIZATION DECLINED: ICD-10-CM

## 2025-05-05 DIAGNOSIS — E11.65 TYPE 2 DIABETES MELLITUS WITH HYPERGLYCEMIA, WITH LONG-TERM CURRENT USE OF INSULIN: Primary | ICD-10-CM

## 2025-05-05 DIAGNOSIS — E11.65 TYPE 2 DIABETES MELLITUS WITH HYPERGLYCEMIA, WITH LONG-TERM CURRENT USE OF INSULIN: ICD-10-CM

## 2025-05-05 DIAGNOSIS — I10 PRIMARY HYPERTENSION: ICD-10-CM

## 2025-05-05 DIAGNOSIS — K21.9 GASTROESOPHAGEAL REFLUX DISEASE WITHOUT ESOPHAGITIS: ICD-10-CM

## 2025-05-05 DIAGNOSIS — E78.2 MIXED HYPERLIPIDEMIA: ICD-10-CM

## 2025-05-05 DIAGNOSIS — Z79.4 TYPE 2 DIABETES MELLITUS WITH HYPERGLYCEMIA, WITH LONG-TERM CURRENT USE OF INSULIN: Primary | ICD-10-CM

## 2025-05-05 LAB
ALBUMIN UR-MCNC: <1.2 MG/DL
CREAT UR-MCNC: 123.1 MG/DL
MICROALBUMIN/CREAT UR: NORMAL MG/G{CREAT}

## 2025-05-05 PROCEDURE — 80053 COMPREHEN METABOLIC PANEL: CPT | Performed by: FAMILY MEDICINE

## 2025-05-05 PROCEDURE — 1160F RVW MEDS BY RX/DR IN RCRD: CPT | Performed by: FAMILY MEDICINE

## 2025-05-05 PROCEDURE — 1159F MED LIST DOCD IN RCRD: CPT | Performed by: FAMILY MEDICINE

## 2025-05-05 PROCEDURE — 82043 UR ALBUMIN QUANTITATIVE: CPT | Performed by: FAMILY MEDICINE

## 2025-05-05 PROCEDURE — 82570 ASSAY OF URINE CREATININE: CPT | Performed by: FAMILY MEDICINE

## 2025-05-05 PROCEDURE — 83036 HEMOGLOBIN GLYCOSYLATED A1C: CPT | Performed by: FAMILY MEDICINE

## 2025-05-05 PROCEDURE — 3046F HEMOGLOBIN A1C LEVEL >9.0%: CPT | Performed by: FAMILY MEDICINE

## 2025-05-05 PROCEDURE — 3074F SYST BP LT 130 MM HG: CPT | Performed by: FAMILY MEDICINE

## 2025-05-05 PROCEDURE — 1126F AMNT PAIN NOTED NONE PRSNT: CPT | Performed by: FAMILY MEDICINE

## 2025-05-05 PROCEDURE — 99214 OFFICE O/P EST MOD 30 MIN: CPT | Performed by: FAMILY MEDICINE

## 2025-05-05 PROCEDURE — 3078F DIAST BP <80 MM HG: CPT | Performed by: FAMILY MEDICINE

## 2025-05-05 PROCEDURE — 36415 COLL VENOUS BLD VENIPUNCTURE: CPT | Performed by: FAMILY MEDICINE

## 2025-05-05 NOTE — PROGRESS NOTES
Follow Up Office Visit      Date: 2025   Patient Name: Colten Ogden  : 1985   MRN: [id number]     Chief Complaint:    Chief Complaint   Patient presents with    Diabetes    Follow-up       History of Present Illness: Colten Ogden is a 39 y.o. male who is here today for follow-up of his diabetes.    History of Present Illness  The patient is a 39-year-old male who presents for evaluation of diabetes, cholesterol management, blood pressure management, and arthritis.    He reports that his blood glucose levels have been fluctuating, with readings as low as 120 and as high as 280 to 300 postprandially. Interestingly, he notes that his glucose levels tend to rise slightly when he abstains from eating throughout the day. He has not experienced any episodes of hypoglycemia, such as readings in the 50s or 60s accompanied by symptoms like shakiness. He has been adhering to his nightly Lantus regimen, administering 40 units each night. He also continues his atorvastatin, Jardiance, lisinopril, metformin, and pioglitazone therapy. He is open to the possibility of using an insulin pump if necessary.    He has an upcoming appointment with a podiatrist on 2025 for a suspected cyst on his foot.    He reports no chest pain, shortness of breath, or palpitations. His bowel movements are regular, although he does experience occasional diarrhea, which he attributes to his diet. He has not experienced any episodes of vomiting. He has noticed a change in his urine color, describing it as darker than usual. He has been consuming zero-sugar beverages.    He has been experiencing shoulder pain, which limits his range of motion, particularly when reaching upwards. However, he reports that the pain does not persist overnight.    He has lost a little bit of his peripheral vision but overall, he feels good. He has noticed a slight decline in his night vision.    SOCIAL HISTORY  He does not smoke or drink alcohol.      Patient appears to be doing otherwise well.  They have continue with their medications without any side effects.  They have not had any changes in their usual activity, appetite and sleep.  Patient denies any other cardiovascular, respiratory, gastrointestinal, urologic or neurologic complaints.    Subjective      Review of Systems:   Review of Systems   Constitutional:  Negative for activity change, appetite change and fatigue.   Respiratory:  Negative for cough, chest tightness, shortness of breath and wheezing.    Cardiovascular:  Negative for chest pain, palpitations and leg swelling.   Gastrointestinal:  Negative for abdominal distention, abdominal pain, blood in stool, constipation, diarrhea, nausea, vomiting, GERD and indigestion.   Genitourinary:  Negative for difficulty urinating, dysuria, flank pain, frequency, hematuria and urgency.   Musculoskeletal:  Negative for arthralgias, back pain, gait problem, joint swelling and myalgias.   Neurological:  Negative for dizziness, tremors, seizures, syncope, weakness, light-headedness, numbness, headache and memory problem.   Psychiatric/Behavioral:  Negative for sleep disturbance and depressed mood. The patient is not nervous/anxious.        I have reviewed the patients family history, social history, past medical history, past surgical history and have updated it as appropriate.     Medications:     Current Outpatient Medications:     atorvastatin (LIPITOR) 80 MG tablet, TAKE ONE TABLET BY MOUTH EVERY DAY FOR CHOLESTEROL, Disp: 30 tablet, Rfl: 12    BD Pen Needle Rosario U/F 32G X 4 MM misc, USE 1 DAILY., Disp: 30 each, Rfl: 12    Blood Glucose Monitoring Suppl kit, 1 each 3 (Three) Times a Day., Disp: 1 each, Rfl: 0    Continuous Glucose  (Dexcom G7 ) device, Use 1 each Daily., Disp: 1 each, Rfl: 11    Continuous Glucose Sensor (Dexcom G7 Sensor) misc, Use 1 each Every 10 (Ten) Days., Disp: 3 each, Rfl: 11    Diclofenac Sodium (VOLTAREN) 1 % gel  "gel, Apply 4 g topically to the appropriate area as directed 4 (Four) Times a Day., Disp: 350 g, Rfl: 11    ezetimibe (ZETIA) 10 MG tablet, TAKE ONE TABLET BY MOUTH EVERY DAY, Disp: 90 tablet, Rfl: 3    Glucose Blood (Blood Glucose Test) strip, 1 each by In Vitro route 3 (Three) Times a Day., Disp: 100 each, Rfl: 11    Insulin Glargine (LANTUS SOLOSTAR) 100 UNIT/ML injection pen, Inject 40 Units under the skin into the appropriate area as directed Every Night., Disp: 15 mL, Rfl: 3    Jardiance 25 MG tablet tablet, Take 1 tablet by mouth Daily., Disp: 90 tablet, Rfl: 3    Lancets (OneTouch Delica Plus Yanjis22O) misc, 1 each by Other route Daily., Disp: , Rfl:     lisinopril (PRINIVIL,ZESTRIL) 10 MG tablet, TAKE 1 TABLET BY MOUTH DAILY., Disp: 30 tablet, Rfl: 3    metFORMIN (GLUCOPHAGE) 1000 MG tablet, TAKE ONE TABLET BY MOUTH TWO TIMES A DAY WITH MEALS., Disp: 60 tablet, Rfl: 12    pioglitazone (ACTOS) 45 MG tablet, TAKE 1 TABLET BY MOUTH DAILY., Disp: 90 tablet, Rfl: 3    Allergies:   No Known Allergies    Immunizations:   Immunization History   Administered Date(s) Administered    COVID-19 (MODERNA) 1st,2nd,3rd Dose Monovalent 03/24/2021, 04/21/2021    COVID-19 (UNSPECIFIED) 03/24/2021, 04/21/2021    Fluzone (or Fluarix & Flulaval for VFC) >6mos 09/14/2018, 11/02/2022    Hep B, Adolescent or Pediatric 02/13/2002, 03/18/2002, 08/13/2002    Influenza Seasonal Injectable 09/25/2009    Influenza, Unspecified 09/25/2009, 11/02/2022    Pneumococcal Conjugate 20-Valent (PCV20) 02/06/2023    Td (TDVAX) 08/13/2002    Tdap 02/06/2023        Objective     Physical Exam: Please see above  Vital Signs:   Vitals:    05/05/25 1105   BP: 110/70   BP Location: Left arm   Patient Position: Sitting   Cuff Size: Adult   Pulse: 104   Resp: 18   Temp: 98 °F (36.7 °C)   TempSrc: Temporal   SpO2: 97%   Weight: 93 kg (205 lb)   Height: 185.4 cm (72.99\")     Body mass index is 27.05 kg/m².          Physical Exam  Vitals and nursing note " reviewed.   Constitutional:       Appearance: Normal appearance.   HENT:      Head: Normocephalic and atraumatic.      Nose: Nose normal.      Mouth/Throat:      Pharynx: Oropharynx is clear.   Eyes:      Extraocular Movements: Extraocular movements intact.      Pupils: Pupils are equal, round, and reactive to light.   Neck:      Thyroid: No thyroid mass or thyromegaly.      Trachea: Trachea normal.   Cardiovascular:      Rate and Rhythm: Normal rate and regular rhythm.      Pulses: Normal pulses. No decreased pulses.      Heart sounds: Normal heart sounds.   Pulmonary:      Effort: Pulmonary effort is normal.      Breath sounds: Normal breath sounds.   Abdominal:      General: Abdomen is flat. Bowel sounds are normal.      Palpations: Abdomen is soft.      Tenderness: There is no abdominal tenderness.   Musculoskeletal:      Cervical back: Neck supple.      Right lower leg: No edema.      Left lower leg: No edema.   Lymphadenopathy:      Cervical: No cervical adenopathy.   Skin:     General: Skin is warm and dry.   Neurological:      General: No focal deficit present.      Mental Status: He is alert and oriented to person, place, and time.      Sensory: Sensation is intact.      Motor: Motor function is intact.      Coordination: Coordination is intact.   Psychiatric:         Attention and Perception: Attention normal.         Mood and Affect: Mood normal.         Speech: Speech normal.         Behavior: Behavior normal.         Procedures    Results:   Labs:   Hemoglobin A1C   Date Value Ref Range Status   02/04/2025 10.30 (H) 4.80 - 5.60 % Final     TSH   Date Value Ref Range Status   02/04/2025 1.740 0.270 - 4.200 uIU/mL Final        POCT Results (if applicable):   Results for orders placed or performed in visit on 02/04/25   CBC (No Diff)    Collection Time: 02/04/25  9:28 AM    Specimen: Arm, Right; Blood   Result Value Ref Range    WBC 7.04 3.40 - 10.80 10*3/mm3    RBC 4.80 4.14 - 5.80 10*6/mm3    Hemoglobin  14.8 13.0 - 17.7 g/dL    Hematocrit 45.0 37.5 - 51.0 %    MCV 93.8 79.0 - 97.0 fL    MCH 30.8 26.6 - 33.0 pg    MCHC 32.9 31.5 - 35.7 g/dL    RDW 12.6 12.3 - 15.4 %    RDW-SD 42.7 37.0 - 54.0 fl    MPV 9.4 6.0 - 12.0 fL    Platelets 351 140 - 450 10*3/mm3   Comprehensive Metabolic Panel    Collection Time: 02/04/25  9:28 AM    Specimen: Arm, Right; Blood   Result Value Ref Range    Glucose 188 (H) 65 - 99 mg/dL    BUN 18 6 - 20 mg/dL    Creatinine 1.15 0.76 - 1.27 mg/dL    Sodium 141 136 - 145 mmol/L    Potassium 3.7 3.5 - 5.2 mmol/L    Chloride 101 98 - 107 mmol/L    CO2 27.0 22.0 - 29.0 mmol/L    Calcium 10.5 8.6 - 10.5 mg/dL    Total Protein 7.7 6.0 - 8.5 g/dL    Albumin 4.6 3.5 - 5.2 g/dL    ALT (SGPT) 26 1 - 41 U/L    AST (SGOT) 18 1 - 40 U/L    Alkaline Phosphatase 82 39 - 117 U/L    Total Bilirubin 0.4 0.0 - 1.2 mg/dL    Globulin 3.1 gm/dL    A/G Ratio 1.5 g/dL    BUN/Creatinine Ratio 15.7 7.0 - 25.0    Anion Gap 13.0 5.0 - 15.0 mmol/L    eGFR 83.5 >60.0 mL/min/1.73   Hemoglobin A1c    Collection Time: 02/04/25  9:28 AM    Specimen: Arm, Right; Blood   Result Value Ref Range    Hemoglobin A1C 10.30 (H) 4.80 - 5.60 %   Lipid Panel    Collection Time: 02/04/25  9:28 AM    Specimen: Arm, Right; Blood   Result Value Ref Range    Total Cholesterol 107 0 - 200 mg/dL    Triglycerides 87 0 - 150 mg/dL    HDL Cholesterol 52 40 - 60 mg/dL    LDL Cholesterol  38 0 - 100 mg/dL    VLDL Cholesterol 17 5 - 40 mg/dL    LDL/HDL Ratio 0.72    Vitamin B12    Collection Time: 02/04/25  9:28 AM    Specimen: Arm, Right; Blood   Result Value Ref Range    Vitamin B-12 420 211 - 946 pg/mL   TSH Rfx On Abnormal To Free T4    Collection Time: 02/04/25  9:28 AM    Specimen: Arm, Right; Blood   Result Value Ref Range    TSH 1.740 0.270 - 4.200 uIU/mL   Urinalysis without microscopic (no culture) - Urine, Clean Catch    Collection Time: 02/04/25  9:28 AM    Specimen: Urine, Clean Catch   Result Value Ref Range    Color, UA Yellow Yellow,  Straw    Appearance, UA Clear Clear    pH, UA 5.5 5.0 - 8.0    Specific Gravity, UA 1.025 1.005 - 1.030    Glucose, UA >=1000 mg/dL (3+) (A) Negative    Ketones, UA Trace (A) Negative    Bilirubin, UA Negative Negative    Blood, UA Negative Negative    Protein, UA Trace (A) Negative    Leuk Esterase, UA Negative Negative    Nitrite, UA Negative Negative    Urobilinogen, UA 1.0 E.U./dL 0.2 - 1.0 E.U./dL   Urine Drug Screen - Urine, Clean Catch    Collection Time: 02/04/25  9:28 AM    Specimen: Urine, Clean Catch   Result Value Ref Range    THC, Screen, Urine Positive (A) Negative    Phencyclidine (PCP), Urine Negative Negative    Cocaine Screen, Urine Negative Negative    Methamphetamine, Ur Positive (A) Negative    Opiate Screen Negative Negative    Amphetamine Screen, Urine Positive (A) Negative    Benzodiazepine Screen, Urine Positive (A) Negative    Tricyclic Antidepressants Screen Negative Negative    Methadone Screen, Urine Negative Negative    Barbiturates Screen, Urine Negative Negative    Oxycodone Screen, Urine Negative Negative    Buprenorphine, Screen, Urine Negative Negative   MicroAlbumin, Urine, Random - Urine, Clean Catch    Collection Time: 02/04/25  9:28 AM    Specimen: Urine, Clean Catch   Result Value Ref Range    Microalbumin, Urine 3.0 mg/dL   Fentanyl, Urine - Urine, Clean Catch    Collection Time: 02/04/25  9:28 AM    Specimen: Urine, Clean Catch   Result Value Ref Range    Fentanyl, Urine Negative Negative       Imaging:   No valid procedures specified.     Measures:   Advanced Care Planning:   Patient does not have an advance directive, information provided.    Smoking Cessation:   Non-smoker.    Assessment / Plan      Assessment/Plan:   Diagnoses and all orders for this visit:    1. Type 2 diabetes mellitus with hyperglycemia, with long-term current use of insulin (Primary)  Patient does appear to be doing relatively well with respect to their diabetes.  They are tolerating their  medications without complaints.  We will continue to follow their hemoglobin A1c and will make adjustments to keep their level less than 7%.  We will obtain hemoglobin A1c.  We have encouraged him to use the continuous glucose monitor.  It may be necessary for us to consider an insulin pump if he shows no improvement in his hemoglobin A1c.  -     Hemoglobin A1c; Future  -     Microalbumin / Creatinine Urine Ratio - Urine, Clean Catch; Future    2. Mixed hyperlipidemia   Patient does appear to be tolerating their lipid-lowering agent without difficulty.  We will obtain the lipid profile as well as liver function test to observe for any abnormalities.  We will continue to adjust medications to keep their LDL less than 70.    3. Primary hypertension  Patient appears to be tolerating their blood pressure medicine without any side effects.  We will continue to monitor their blood pressure and will adjust to keep there is systolic blood pressure less than 130.  We will continue to monitor renal function and will make adjustments based on these findings.  -     Comprehensive Metabolic Panel; Future    4. Adult ADHD  Patient does continue have issues with ADHD.  We will repeat a urinalysis to see if he is positive for substances that he is not being prescribed.  We will wait for results and will consider reinitiation of medication if the urine drug screen is normal.  -     COMPLIANCE DRUG ANALYSIS, NO THC -; Future    5. Immunization declined   Patient would benefit from having immunizations given.  Patient has elected not to receive the recommended vaccines at present time.  They understand the risk of not receiving these vaccine and the disease in which they may incur.  We have discussed other options and will pursue with encouragement of compliance with future appointments.  If patient does change their minds prior to the next scheduled follow-up, they may contact us and we will provide immunization at that time.    6.  Gastroesophageal reflux disease without esophagitis   Patient is doing well at present time with respect to the reflux symptomatology.  They are tolerating their medications without any complaints at present time.  We will continue to monitor their symptoms and if they develop dysphagia, alarm symptoms or worsening symptomatology further evaluation and treatment may be necessary as well as possible referral for an EGD.      Follow Up:   Return in about 3 months (around 8/5/2025).      At [facility], we believe that sharing information builds trust and better relationships. You are receiving this note because you recently visited [facility]. It is possible you will see health information before a provider has talked with you about it. This kind of information can be easy to misunderstand. To help you fully understand what it means for your health, we urge you to discuss this note with your provider.    Steven Rivas MD  [facility]    Patient or patient representative verbalized consent for the use of Ambient Listening during the visit with  Steven Rivas MD for chart documentation. 5/5/2025  11:16 EDT

## 2025-05-06 ENCOUNTER — RESULTS FOLLOW-UP (OUTPATIENT)
Dept: FAMILY MEDICINE CLINIC | Facility: CLINIC | Age: 40
End: 2025-05-06
Payer: COMMERCIAL

## 2025-05-06 LAB
ALBUMIN SERPL-MCNC: 4.1 G/DL (ref 3.5–5.2)
ALBUMIN/GLOB SERPL: 1.6 G/DL
ALP SERPL-CCNC: 78 U/L (ref 39–117)
ALT SERPL W P-5'-P-CCNC: 26 U/L (ref 1–41)
ANION GAP SERPL CALCULATED.3IONS-SCNC: 10.5 MMOL/L (ref 5–15)
AST SERPL-CCNC: 23 U/L (ref 1–40)
BILIRUB SERPL-MCNC: 0.2 MG/DL (ref 0–1.2)
BUN SERPL-MCNC: 18 MG/DL (ref 6–20)
BUN/CREAT SERPL: 16.4 (ref 7–25)
CALCIUM SPEC-SCNC: 9.4 MG/DL (ref 8.6–10.5)
CHLORIDE SERPL-SCNC: 106 MMOL/L (ref 98–107)
CO2 SERPL-SCNC: 23.5 MMOL/L (ref 22–29)
CREAT SERPL-MCNC: 1.1 MG/DL (ref 0.76–1.27)
EGFRCR SERPLBLD CKD-EPI 2021: 88.1 ML/MIN/1.73
GLOBULIN UR ELPH-MCNC: 2.6 GM/DL
GLUCOSE SERPL-MCNC: 162 MG/DL (ref 65–99)
HBA1C MFR BLD: 9.6 % (ref 4.8–5.6)
POTASSIUM SERPL-SCNC: 4.1 MMOL/L (ref 3.5–5.2)
PROT SERPL-MCNC: 6.7 G/DL (ref 6–8.5)
SODIUM SERPL-SCNC: 140 MMOL/L (ref 136–145)

## 2025-05-06 NOTE — TELEPHONE ENCOUNTER
Attempted to reach the patient regarding results but was unable to contact them or leave a voicemail.

## 2025-05-13 LAB — DRUGS UR: NORMAL

## 2025-07-08 RX ORDER — ATORVASTATIN CALCIUM 80 MG/1
80 TABLET, FILM COATED ORAL DAILY
Qty: 30 TABLET | Refills: 12 | Status: SHIPPED | OUTPATIENT
Start: 2025-07-08

## 2025-08-04 DIAGNOSIS — I10 PRIMARY HYPERTENSION: ICD-10-CM

## 2025-08-04 RX ORDER — LISINOPRIL 10 MG/1
10 TABLET ORAL DAILY
Qty: 30 TABLET | Refills: 3 | Status: SHIPPED | OUTPATIENT
Start: 2025-08-04

## 2025-08-12 ENCOUNTER — LAB (OUTPATIENT)
Dept: FAMILY MEDICINE CLINIC | Facility: CLINIC | Age: 40
End: 2025-08-12
Payer: COMMERCIAL

## 2025-08-12 ENCOUNTER — TELEPHONE (OUTPATIENT)
Dept: FAMILY MEDICINE CLINIC | Facility: CLINIC | Age: 40
End: 2025-08-12

## 2025-08-12 ENCOUNTER — OFFICE VISIT (OUTPATIENT)
Dept: FAMILY MEDICINE CLINIC | Facility: CLINIC | Age: 40
End: 2025-08-12
Payer: COMMERCIAL

## 2025-08-12 VITALS
HEIGHT: 73 IN | WEIGHT: 199 LBS | TEMPERATURE: 98 F | HEART RATE: 85 BPM | SYSTOLIC BLOOD PRESSURE: 116 MMHG | RESPIRATION RATE: 16 BRPM | OXYGEN SATURATION: 98 % | BODY MASS INDEX: 26.37 KG/M2 | DIASTOLIC BLOOD PRESSURE: 90 MMHG

## 2025-08-12 DIAGNOSIS — F90.9 ADULT ADHD: ICD-10-CM

## 2025-08-12 DIAGNOSIS — Z79.4 TYPE 2 DIABETES MELLITUS WITH HYPERGLYCEMIA, WITH LONG-TERM CURRENT USE OF INSULIN: ICD-10-CM

## 2025-08-12 DIAGNOSIS — E78.2 MIXED HYPERLIPIDEMIA: ICD-10-CM

## 2025-08-12 DIAGNOSIS — I10 PRIMARY HYPERTENSION: ICD-10-CM

## 2025-08-12 DIAGNOSIS — Z28.21 IMMUNIZATION DECLINED: ICD-10-CM

## 2025-08-12 DIAGNOSIS — E11.65 TYPE 2 DIABETES MELLITUS WITH HYPERGLYCEMIA, WITH LONG-TERM CURRENT USE OF INSULIN: ICD-10-CM

## 2025-08-12 DIAGNOSIS — K21.9 GASTROESOPHAGEAL REFLUX DISEASE WITHOUT ESOPHAGITIS: ICD-10-CM

## 2025-08-12 DIAGNOSIS — E11.65 TYPE 2 DIABETES MELLITUS WITH HYPERGLYCEMIA, WITH LONG-TERM CURRENT USE OF INSULIN: Primary | ICD-10-CM

## 2025-08-12 DIAGNOSIS — Z79.4 TYPE 2 DIABETES MELLITUS WITH HYPERGLYCEMIA, WITH LONG-TERM CURRENT USE OF INSULIN: Primary | ICD-10-CM

## 2025-08-12 LAB
ALBUMIN SERPL-MCNC: 4.3 G/DL (ref 3.5–5.2)
ALBUMIN/GLOB SERPL: 1.5 G/DL
ALP SERPL-CCNC: 98 U/L (ref 39–117)
ALT SERPL W P-5'-P-CCNC: 155 U/L (ref 1–41)
ANION GAP SERPL CALCULATED.3IONS-SCNC: 11.3 MMOL/L (ref 5–15)
AST SERPL-CCNC: 97 U/L (ref 1–40)
BILIRUB SERPL-MCNC: 0.2 MG/DL (ref 0–1.2)
BUN SERPL-MCNC: 21 MG/DL (ref 6–20)
BUN/CREAT SERPL: 16.5 (ref 7–25)
CALCIUM SPEC-SCNC: 9.3 MG/DL (ref 8.6–10.5)
CHLORIDE SERPL-SCNC: 102 MMOL/L (ref 98–107)
CHOLEST SERPL-MCNC: 162 MG/DL (ref 0–200)
CO2 SERPL-SCNC: 22.7 MMOL/L (ref 22–29)
CREAT SERPL-MCNC: 1.27 MG/DL (ref 0.76–1.27)
EGFRCR SERPLBLD CKD-EPI 2021: 73.7 ML/MIN/1.73
GLOBULIN UR ELPH-MCNC: 2.8 GM/DL
GLUCOSE SERPL-MCNC: 322 MG/DL (ref 65–99)
HBA1C MFR BLD: 10.3 % (ref 4.8–5.6)
HDLC SERPL-MCNC: 66 MG/DL (ref 40–60)
LDLC SERPL CALC-MCNC: 70 MG/DL (ref 0–100)
LDLC/HDLC SERPL: 1 {RATIO}
POTASSIUM SERPL-SCNC: 4.5 MMOL/L (ref 3.5–5.2)
PROT SERPL-MCNC: 7.1 G/DL (ref 6–8.5)
SODIUM SERPL-SCNC: 136 MMOL/L (ref 136–145)
TRIGL SERPL-MCNC: 151 MG/DL (ref 0–150)
VLDLC SERPL-MCNC: 26 MG/DL (ref 5–40)

## 2025-08-12 PROCEDURE — 83036 HEMOGLOBIN GLYCOSYLATED A1C: CPT | Performed by: FAMILY MEDICINE

## 2025-08-12 PROCEDURE — 1126F AMNT PAIN NOTED NONE PRSNT: CPT | Performed by: FAMILY MEDICINE

## 2025-08-12 PROCEDURE — 3080F DIAST BP >= 90 MM HG: CPT | Performed by: FAMILY MEDICINE

## 2025-08-12 PROCEDURE — 83695 ASSAY OF LIPOPROTEIN(A): CPT | Performed by: FAMILY MEDICINE

## 2025-08-12 PROCEDURE — 80061 LIPID PANEL: CPT | Performed by: FAMILY MEDICINE

## 2025-08-12 PROCEDURE — 1159F MED LIST DOCD IN RCRD: CPT | Performed by: FAMILY MEDICINE

## 2025-08-12 PROCEDURE — 1160F RVW MEDS BY RX/DR IN RCRD: CPT | Performed by: FAMILY MEDICINE

## 2025-08-12 PROCEDURE — 80053 COMPREHEN METABOLIC PANEL: CPT | Performed by: FAMILY MEDICINE

## 2025-08-12 PROCEDURE — 3074F SYST BP LT 130 MM HG: CPT | Performed by: FAMILY MEDICINE

## 2025-08-12 PROCEDURE — 99214 OFFICE O/P EST MOD 30 MIN: CPT | Performed by: FAMILY MEDICINE

## 2025-08-12 PROCEDURE — 3046F HEMOGLOBIN A1C LEVEL >9.0%: CPT | Performed by: FAMILY MEDICINE

## 2025-08-12 RX ORDER — ACYCLOVIR 400 MG/1
1 TABLET ORAL DAILY
Qty: 1 EACH | Refills: 11 | Status: SHIPPED | OUTPATIENT
Start: 2025-08-12

## 2025-08-12 RX ORDER — ACYCLOVIR 400 MG/1
1 TABLET ORAL
Qty: 3 EACH | Refills: 11 | Status: SHIPPED | OUTPATIENT
Start: 2025-08-12

## 2025-08-13 ENCOUNTER — RESULTS FOLLOW-UP (OUTPATIENT)
Dept: FAMILY MEDICINE CLINIC | Facility: CLINIC | Age: 40
End: 2025-08-13
Payer: COMMERCIAL

## 2025-08-14 LAB — LPA SERPL-SCNC: 28 NMOL/L

## 2025-08-19 LAB — DRUGS UR: NORMAL
